# Patient Record
Sex: FEMALE | Race: WHITE | NOT HISPANIC OR LATINO | Employment: OTHER | URBAN - METROPOLITAN AREA
[De-identification: names, ages, dates, MRNs, and addresses within clinical notes are randomized per-mention and may not be internally consistent; named-entity substitution may affect disease eponyms.]

---

## 2019-12-18 ENCOUNTER — HOSPITAL ENCOUNTER (INPATIENT)
Facility: HOSPITAL | Age: 84
LOS: 2 days | Discharge: HOME HEALTH | DRG: 065 | End: 2019-12-20
Attending: EMERGENCY MEDICINE | Admitting: INTERNAL MEDICINE
Payer: MEDICARE

## 2019-12-18 DIAGNOSIS — M79.89 LEFT LEG SWELLING: ICD-10-CM

## 2019-12-18 DIAGNOSIS — I63.9 STROKE: Primary | ICD-10-CM

## 2019-12-18 DIAGNOSIS — I63.9 CVA (CEREBRAL VASCULAR ACCIDENT): ICD-10-CM

## 2019-12-18 PROBLEM — I63.412 CEREBRAL INFARCTION DUE TO EMBOLISM OF LEFT MIDDLE CEREBRAL ARTERY: Status: ACTIVE | Noted: 2019-12-18

## 2019-12-18 LAB
ALBUMIN SERPL BCP-MCNC: 3.5 G/DL (ref 3.5–5.2)
ALP SERPL-CCNC: 110 U/L (ref 55–135)
ALT SERPL W/O P-5'-P-CCNC: 17 U/L (ref 10–44)
ANION GAP SERPL CALC-SCNC: 11 MMOL/L (ref 8–16)
AST SERPL-CCNC: 24 U/L (ref 10–40)
BASOPHILS # BLD AUTO: 0.04 K/UL (ref 0–0.2)
BASOPHILS NFR BLD: 0.5 % (ref 0–1.9)
BILIRUB SERPL-MCNC: 0.4 MG/DL (ref 0.1–1)
BUN SERPL-MCNC: 31 MG/DL (ref 10–30)
CALCIUM SERPL-MCNC: 10.2 MG/DL (ref 8.7–10.5)
CHLORIDE SERPL-SCNC: 104 MMOL/L (ref 95–110)
CHOLEST SERPL-MCNC: 163 MG/DL (ref 120–199)
CHOLEST/HDLC SERPL: 2.9 {RATIO} (ref 2–5)
CO2 SERPL-SCNC: 25 MMOL/L (ref 23–29)
CREAT SERPL-MCNC: 0.8 MG/DL (ref 0.5–1.4)
DIFFERENTIAL METHOD: ABNORMAL
EOSINOPHIL # BLD AUTO: 0.1 K/UL (ref 0–0.5)
EOSINOPHIL NFR BLD: 1.5 % (ref 0–8)
ERYTHROCYTE [DISTWIDTH] IN BLOOD BY AUTOMATED COUNT: 13.6 % (ref 11.5–14.5)
EST. GFR  (AFRICAN AMERICAN): >60 ML/MIN/1.73 M^2
EST. GFR  (NON AFRICAN AMERICAN): >60 ML/MIN/1.73 M^2
GLUCOSE SERPL-MCNC: 107 MG/DL (ref 70–110)
GLUCOSE SERPL-MCNC: 138 MG/DL (ref 70–110)
HCT VFR BLD AUTO: 45.3 % (ref 37–48.5)
HDLC SERPL-MCNC: 57 MG/DL (ref 40–75)
HDLC SERPL: 35 % (ref 20–50)
HGB BLD-MCNC: 14.2 G/DL (ref 12–16)
IMM GRANULOCYTES # BLD AUTO: 0.03 K/UL (ref 0–0.04)
INR PPP: 1 (ref 0.8–1.2)
LDLC SERPL CALC-MCNC: 91.4 MG/DL (ref 63–159)
LYMPHOCYTES # BLD AUTO: 3.6 K/UL (ref 1–4.8)
LYMPHOCYTES NFR BLD: 42.3 % (ref 18–48)
MCH RBC QN AUTO: 30.7 PG (ref 27–31)
MCHC RBC AUTO-ENTMCNC: 31.3 G/DL (ref 32–36)
MCV RBC AUTO: 98 FL (ref 82–98)
MONOCYTES # BLD AUTO: 0.9 K/UL (ref 0.3–1)
MONOCYTES NFR BLD: 10.2 % (ref 4–15)
NEUTROPHILS # BLD AUTO: 3.9 K/UL (ref 1.8–7.7)
NEUTROPHILS NFR BLD: 45.1 % (ref 38–73)
NONHDLC SERPL-MCNC: 106 MG/DL
NRBC BLD-RTO: 0 /100 WBC
PLATELET # BLD AUTO: 177 K/UL (ref 150–350)
PMV BLD AUTO: 10.1 FL (ref 9.2–12.9)
POTASSIUM SERPL-SCNC: 4.5 MMOL/L (ref 3.5–5.1)
PROT SERPL-MCNC: 7.4 G/DL (ref 6–8.4)
PROTHROMBIN TIME: 10.7 SEC (ref 9–12.5)
RBC # BLD AUTO: 4.62 M/UL (ref 4–5.4)
SODIUM SERPL-SCNC: 140 MMOL/L (ref 136–145)
TRIGL SERPL-MCNC: 73 MG/DL (ref 30–150)
TSH SERPL DL<=0.005 MIU/L-ACNC: 2.76 UIU/ML (ref 0.4–4)
WBC # BLD AUTO: 8.56 K/UL (ref 3.9–12.7)

## 2019-12-18 PROCEDURE — 63600175 PHARM REV CODE 636 W HCPCS: Performed by: EMERGENCY MEDICINE

## 2019-12-18 PROCEDURE — 82962 GLUCOSE BLOOD TEST: CPT

## 2019-12-18 PROCEDURE — 80061 LIPID PANEL: CPT

## 2019-12-18 PROCEDURE — 93005 ELECTROCARDIOGRAM TRACING: CPT

## 2019-12-18 PROCEDURE — 99291 CRITICAL CARE FIRST HOUR: CPT | Mod: 25

## 2019-12-18 PROCEDURE — 84443 ASSAY THYROID STIM HORMONE: CPT

## 2019-12-18 PROCEDURE — 25000003 PHARM REV CODE 250: Performed by: EMERGENCY MEDICINE

## 2019-12-18 PROCEDURE — 80053 COMPREHEN METABOLIC PANEL: CPT

## 2019-12-18 PROCEDURE — 85610 PROTHROMBIN TIME: CPT

## 2019-12-18 PROCEDURE — 85025 COMPLETE CBC W/AUTO DIFF WBC: CPT

## 2019-12-18 PROCEDURE — 36415 COLL VENOUS BLD VENIPUNCTURE: CPT

## 2019-12-18 PROCEDURE — G0425 PR INPT TELEHEALTH CONSULT 30M: ICD-10-PCS | Mod: GT,G0,, | Performed by: PSYCHIATRY & NEUROLOGY

## 2019-12-18 PROCEDURE — G0425 INPT/ED TELECONSULT30: HCPCS | Mod: GT,G0,, | Performed by: PSYCHIATRY & NEUROLOGY

## 2019-12-18 PROCEDURE — 12000002 HC ACUTE/MED SURGE SEMI-PRIVATE ROOM

## 2019-12-18 RX ORDER — PRAVASTATIN SODIUM 20 MG/1
20 TABLET ORAL DAILY
Status: ON HOLD | COMMUNITY
End: 2020-03-02 | Stop reason: HOSPADM

## 2019-12-18 RX ORDER — METOPROLOL TARTRATE 50 MG/1
50 TABLET ORAL 2 TIMES DAILY
COMMUNITY

## 2019-12-18 RX ORDER — ASPIRIN 325 MG
325 TABLET ORAL
Status: COMPLETED | OUTPATIENT
Start: 2019-12-18 | End: 2019-12-18

## 2019-12-18 RX ORDER — ASPIRIN 81 MG/1
81 TABLET ORAL DAILY
Status: ON HOLD | COMMUNITY
End: 2019-12-20 | Stop reason: HOSPADM

## 2019-12-18 RX ORDER — LOSARTAN POTASSIUM 25 MG/1
25 TABLET ORAL DAILY
Status: ON HOLD | COMMUNITY
End: 2020-03-02 | Stop reason: HOSPADM

## 2019-12-18 RX ADMIN — SODIUM CHLORIDE 500 ML: 0.9 INJECTION, SOLUTION INTRAVENOUS at 11:12

## 2019-12-18 RX ADMIN — ASPIRIN 325 MG ORAL TABLET 325 MG: 325 PILL ORAL at 11:12

## 2019-12-19 PROBLEM — I63.9 STROKE: Status: ACTIVE | Noted: 2019-12-19

## 2019-12-19 PROBLEM — N30.00 ACUTE CYSTITIS WITHOUT HEMATURIA: Status: ACTIVE | Noted: 2019-12-19

## 2019-12-19 PROBLEM — R47.01 APHASIA: Status: ACTIVE | Noted: 2019-12-19

## 2019-12-19 PROBLEM — I10 ESSENTIAL HYPERTENSION: Status: ACTIVE | Noted: 2019-12-19

## 2019-12-19 PROBLEM — M79.89 LEFT LEG SWELLING: Status: ACTIVE | Noted: 2019-12-19

## 2019-12-19 LAB
APTT BLDCRRT: 26.4 SEC (ref 21–32)
BACTERIA #/AREA URNS HPF: ABNORMAL /HPF
BILIRUB UR QL STRIP: NEGATIVE
CK MB SERPL-MCNC: 1.7 NG/ML (ref 0.1–6.5)
CK MB SERPL-RTO: 5.2 % (ref 0–5)
CK SERPL-CCNC: 33 U/L (ref 20–180)
CLARITY UR: ABNORMAL
COLOR UR: YELLOW
ESTIMATED AVG GLUCOSE: 103 MG/DL (ref 68–131)
GLUCOSE UR QL STRIP: NEGATIVE
HBA1C MFR BLD HPLC: 5.2 % (ref 4–5.6)
HGB UR QL STRIP: ABNORMAL
INR PPP: 1 (ref 0.8–1.2)
KETONES UR QL STRIP: NEGATIVE
LEUKOCYTE ESTERASE UR QL STRIP: ABNORMAL
MICROSCOPIC COMMENT: ABNORMAL
NITRITE UR QL STRIP: POSITIVE
PH UR STRIP: 6 [PH] (ref 5–8)
PROT UR QL STRIP: ABNORMAL
PROTHROMBIN TIME: 10.4 SEC (ref 9–12.5)
RBC #/AREA URNS HPF: 5 /HPF (ref 0–4)
SP GR UR STRIP: 1.02 (ref 1–1.03)
SQUAMOUS #/AREA URNS HPF: 0 /HPF
URN SPEC COLLECT METH UR: ABNORMAL
UROBILINOGEN UR STRIP-ACNC: NEGATIVE EU/DL
WBC #/AREA URNS HPF: 60 /HPF (ref 0–5)

## 2019-12-19 PROCEDURE — 82550 ASSAY OF CK (CPK): CPT

## 2019-12-19 PROCEDURE — 87077 CULTURE AEROBIC IDENTIFY: CPT

## 2019-12-19 PROCEDURE — 12000002 HC ACUTE/MED SURGE SEMI-PRIVATE ROOM

## 2019-12-19 PROCEDURE — 97165 OT EVAL LOW COMPLEX 30 MIN: CPT

## 2019-12-19 PROCEDURE — 81000 URINALYSIS NONAUTO W/SCOPE: CPT

## 2019-12-19 PROCEDURE — 85730 THROMBOPLASTIN TIME PARTIAL: CPT

## 2019-12-19 PROCEDURE — 85610 PROTHROMBIN TIME: CPT

## 2019-12-19 PROCEDURE — 25000003 PHARM REV CODE 250: Performed by: NURSE PRACTITIONER

## 2019-12-19 PROCEDURE — 87088 URINE BACTERIA CULTURE: CPT

## 2019-12-19 PROCEDURE — 87086 URINE CULTURE/COLONY COUNT: CPT

## 2019-12-19 PROCEDURE — 92610 EVALUATE SWALLOWING FUNCTION: CPT

## 2019-12-19 PROCEDURE — 97530 THERAPEUTIC ACTIVITIES: CPT

## 2019-12-19 PROCEDURE — 97162 PT EVAL MOD COMPLEX 30 MIN: CPT

## 2019-12-19 PROCEDURE — 82553 CREATINE MB FRACTION: CPT

## 2019-12-19 PROCEDURE — 63600175 PHARM REV CODE 636 W HCPCS: Performed by: NURSE PRACTITIONER

## 2019-12-19 PROCEDURE — 96374 THER/PROPH/DIAG INJ IV PUSH: CPT

## 2019-12-19 PROCEDURE — 94761 N-INVAS EAR/PLS OXIMETRY MLT: CPT

## 2019-12-19 PROCEDURE — 36415 COLL VENOUS BLD VENIPUNCTURE: CPT

## 2019-12-19 PROCEDURE — 87186 SC STD MICRODIL/AGAR DIL: CPT

## 2019-12-19 PROCEDURE — 83036 HEMOGLOBIN GLYCOSYLATED A1C: CPT

## 2019-12-19 RX ORDER — AMOXICILLIN 250 MG
1 CAPSULE ORAL 2 TIMES DAILY PRN
Status: DISCONTINUED | OUTPATIENT
Start: 2019-12-19 | End: 2019-12-20 | Stop reason: HOSPADM

## 2019-12-19 RX ORDER — ACETAMINOPHEN 325 MG/1
650 TABLET ORAL EVERY 6 HOURS PRN
Status: DISCONTINUED | OUTPATIENT
Start: 2019-12-19 | End: 2019-12-20 | Stop reason: HOSPADM

## 2019-12-19 RX ORDER — LABETALOL HYDROCHLORIDE 5 MG/ML
10 INJECTION, SOLUTION INTRAVENOUS
Status: DISCONTINUED | OUTPATIENT
Start: 2019-12-19 | End: 2019-12-20 | Stop reason: HOSPADM

## 2019-12-19 RX ORDER — ONDANSETRON 2 MG/ML
8 INJECTION INTRAMUSCULAR; INTRAVENOUS EVERY 12 HOURS PRN
Status: DISCONTINUED | OUTPATIENT
Start: 2019-12-19 | End: 2019-12-20 | Stop reason: HOSPADM

## 2019-12-19 RX ORDER — ASPIRIN 81 MG/1
81 TABLET ORAL DAILY
Status: DISCONTINUED | OUTPATIENT
Start: 2019-12-19 | End: 2019-12-19

## 2019-12-19 RX ORDER — ASPIRIN 325 MG
325 TABLET, DELAYED RELEASE (ENTERIC COATED) ORAL DAILY
Status: DISCONTINUED | OUTPATIENT
Start: 2019-12-19 | End: 2019-12-20 | Stop reason: HOSPADM

## 2019-12-19 RX ORDER — SODIUM CHLORIDE 0.9 % (FLUSH) 0.9 %
10 SYRINGE (ML) INJECTION
Status: DISCONTINUED | OUTPATIENT
Start: 2019-12-19 | End: 2019-12-20 | Stop reason: HOSPADM

## 2019-12-19 RX ORDER — PRAVASTATIN SODIUM 10 MG/1
20 TABLET ORAL DAILY
Status: DISCONTINUED | OUTPATIENT
Start: 2019-12-19 | End: 2019-12-20 | Stop reason: HOSPADM

## 2019-12-19 RX ORDER — PANTOPRAZOLE SODIUM 40 MG/1
40 TABLET, DELAYED RELEASE ORAL DAILY
Status: DISCONTINUED | OUTPATIENT
Start: 2019-12-19 | End: 2019-12-20 | Stop reason: HOSPADM

## 2019-12-19 RX ADMIN — ACETAMINOPHEN 650 MG: 325 TABLET ORAL at 11:12

## 2019-12-19 RX ADMIN — CEFTRIAXONE 1 G: 1 INJECTION, SOLUTION INTRAVENOUS at 03:12

## 2019-12-19 NOTE — PLAN OF CARE
Problem: Physical Therapy Goal  Goal: Physical Therapy Goal  Description  Goals to be met by: 01-     Patient will increase functional independence with mobility by performin. Supine to sit with MInimal Assistance  2. Sit to stand transfer with Minimal Assistance  3. Bed to chair transfer with Minimal Assistance using Rolling Walker  4. Gait  x 50 feet with Minimal Assistance using Rolling Walker.   5. Lower extremity exercise program x20 reps    Outcome: Ongoing, Progressing   PT eval and treat completed. Pt able to ambulate 5 ft with RW min assist x2 with fatigue. OOB to chair. Son present. Pt with dysarthria. Pt to benefit from return to [facility] with HHPT

## 2019-12-19 NOTE — PT/OT/SLP EVAL
Speech Language Pathology Evaluation  Bedside Swallow    Patient Name:  Vaishali Hoyt   MRN:  [id number]  Admitting Diagnosis: Stroke    Recommendations:                 General Recommendations:  Speech language evaluation and Cognitive-linguistic evaluation  Diet recommendations:  Regular, Thin   Aspiration Precautions: Standard aspiration precautions   General Precautions: Standard, aspiration, fall, aphasia  Communication strategies:  provide increased time to answer    History:     Past Medical History:   Diagnosis Date    Hypertension     Paroxysmal SVT (supraventricular tachycardia)        History reviewed. No pertinent surgical history.    Social History: Patient resides at [facility].    Prior Intubation HX:  None this admit    Modified Barium Swallow: None    Imaging:  MRI BRAIN WITHOUT CONTRAST   Final Result   Abnormal      1. Small focus of acute posterosuperior left frontal cortical ischemia/infarction.   2. Age-appropriate cerebral volume loss and chronic microvascular ischemic changes within the periventricular and subcortical white matter of the supratentorial brain and within the almaz.   3. Minimal ethmoid sinus disease.   4. Nonspecific well-circumscribed 18 x 26 mm cystic structure present along the lateral border of the left adenoid tonsils.  Consider additional evaluation with a contrast enhanced study as deemed clinically appropriate..  This may represent a retention cyst.   This report was flagged in Epic as abnormal.         Electronically signed by: Enrico Schmitz MD   Date:    12/19/2019   Time:    11:10      US Lower Extremity Veins Left   Final Result      No evidence of deep venous thrombosis in the left lower extremity.         Electronically signed by: William Wood MD   Date:    12/19/2019   Time:    07:10      X-Ray Chest AP Portable   Final Result      Bibasilar coarsened interstitial lung markings.  No acute process identified on this limited single view.         Electronically signed  "by: Ritchie Morris MD   Date:    12/18/2019   Time:    23:27      CT Head Without Contrast   Final Result      No CT evidence of acute intracranial abnormality. If the patient has an acute, focal neurological deficit, MRI of the brain may be indicated.      Generalized cerebral volume loss and chronic ischemic changes as above.         Electronically signed by: Ritchie Morris MD   Date:    12/18/2019   Time:    22:59      US Carotid Bilateral    (Results Pending)        Prior diet: Regular/thin.    Subjective   "Every morning I eat scrambled eggs."  Asking about test results.     Objective:   Pt seen for clinical swallow evaluation. She is AAOx3, follows simple commands. States the current month as "Isa Saldana is coming."  Reduced speech intelligibility and dysfluencies noted. Son at bedside. No prior h/o dysphagia.     Oral Musculature Evaluation  · Oral Musculature: WFL(mild L weakness at rest)  · Dentition: present and adequate  · Secretion Management: adequate  · Mucosal Quality: adequate  · Mandibular Strength and Mobility: WFL  · Oral Labial Strength and Mobility: WFL  · Lingual Strength and Mobility: impaired protrusion(deviates to L upon protrusion)  · Velar Elevation: WFL  · Buccal Strength and Mobility: WFL  · Volitional Cough: fair  · Volitional Swallow: able to palpate larygneal rise  · Voice Prior to PO Intake: reduced vocal intensity, clear     Bedside Swallow Eval:   Consistencies Assessed:  · Thin liquids --via tsp, cup , straw, continuous cup/straw sips  · Puree --applesauce  · Mixed consistencies --diced peaches  · Solids --cookie     Oral Phase:   · WFL    Pharyngeal Phase:   · no overt clinical signs/symptoms of aspiration    Compensatory Strategies  · None    Treatment: Pt/family educated re: results/recs of evaluation, SLP role and POC. Receptive to information provided.     Assessment:   Clinical swallowing evaluation completed. All po trials tolerated with no overt s/s swallow dysfunction. " REC Regular textures with thin liquids. Will f/u for full SLP evaluation.      Goals:   Multidisciplinary Problems     SLP Goals     Not on file                Plan:     · Patient to be seen:      · Plan of Care expires:     · Plan of Care reviewed with:  patient, son   · SLP Follow-Up:  Yes      Time Tracking:     SLP Treatment Date:   12/19/19  Speech Start Time:  0927  Speech Stop Time:  0946     Speech Total Time (min):  19 min    Billable Minutes: Eval Swallow and Oral Function 19 and Total Time 19    Paola Skelton CCC-SLP  12/19/2019

## 2019-12-19 NOTE — PLAN OF CARE
Patient AAO, VSS. PIV/CDI/ Infusing. Telemetry monitoring in place. Pt NPO until KRISTAIN or SLP completed.  Pt having aphasia. UTI being treated with rocephin. Pt LOC being monitored by NIH stroke scale. Pt verbalized understanding of POC. Purposeful hourly/q2hr rounding done during shift to promote patient safety. Patient free from falls and injury during shift.  Bed in lowest position, brakes locked, and call light within reach.  Will continue to monitor.

## 2019-12-19 NOTE — ASSESSMENT & PLAN NOTE
Chronic, controlled.  Will hold anti- hypertensive medications for now in the setting of possible CVA. Latest blood pressure and vitals reviewed-   Temp:  [96.8 °F (36 °C)-97.9 °F (36.6 °C)]   Pulse:  [80-85]   Resp:  [16-17]   BP: (135-156)/(68-72)   SpO2:  [96 %-97 %] .   Home meds for hypertension were reviewed and noted below. Hospital anti-hypertensive changes were made as shown below.  Hypertension Medications             losartan (COZAAR) 25 MG tablet Take 25 mg by mouth once daily.    metoprolol tartrate (LOPRESSOR) 50 MG tablet Take 50 mg by mouth 2 (two) times daily.      Hospital Medications             labetalol injection 10 mg 10 mg, Intravenous, Every 15 min PRN, Max dose 300 mg/24hrs<BR>Notify MD if 2 doses given within 45 minutes<BR>Do not give if heart rate less than 65 and call MD        Will utilize p.r.n. blood pressure medication only if patient's blood pressure greater than  220/110 and she develops symptoms such as worsening chest pain or shortness of breath.

## 2019-12-19 NOTE — CARE UPDATE
"As of 0001 spoke to Dr. Walker in reference to my several attempts (5) to obtain a PT/INR value in the I-STAT machine, were not successful with those attempts. No value was obtained with the I-STAT reading stated; "No Clot Detected See Manual."  "

## 2019-12-19 NOTE — NURSING
Pt becoming increasingly confused and combative, made NP Zina aware and will contact family to come to bedside. Telemetry monitor not on s/t Patient LOC.

## 2019-12-19 NOTE — ED PROVIDER NOTES
Encounter Date: 12/18/2019    SCRIBE #1 NOTE: I, aSmy Garg, am scribing for, and in the presence of, Baldemar Walker MD.       History     Chief Complaint   Patient presents with    Cerebrovascular Accident     slurred speech since 530 pm       Time seen by provider: 10:48 PM on 12/18/2019    Vaishali Hoyt is a 89 y.o. female with PMHx of UTI  who presents to the ED via EMS with an onset of slurred speech. The son told EMS the patient was last seen normal x6 hours PTA. When she woke up she had slurred speech. The patient has no Hx of strokes. The patient denies numbness, vision change, or any other symptoms at this time. No pertinent PSHx. No PSHx. Pt is a poor historian for recent and remote events.      The history is provided by the patient, a relative and the EMS personnel.     Review of patient's allergies indicates:  Allergies not on file  No past medical history on file.  No past surgical history on file.  No family history on file.  Social History     Tobacco Use    Smoking status: Not on file   Substance Use Topics    Alcohol use: Not on file    Drug use: Not on file     Review of Systems   Constitutional: Negative for fever.   HENT: Negative for congestion.    Eyes: Negative for visual disturbance.   Respiratory: Negative for wheezing.    Cardiovascular: Negative for chest pain.   Gastrointestinal: Negative for abdominal pain.   Genitourinary: Negative for dysuria.   Musculoskeletal: Negative for joint swelling.   Skin: Negative for rash.   Neurological: Positive for speech difficulty. Negative for syncope and numbness.   Hematological: Does not bruise/bleed easily.   Psychiatric/Behavioral: Negative for confusion.       Physical Exam     Initial Vitals   BP Pulse Resp Temp SpO2   -- -- -- -- --      MAP       --         Physical Exam    Nursing note and vitals reviewed.  Constitutional: She appears well-nourished.   HENT:   Head: Normocephalic and atraumatic.   Eyes: Conjunctivae and EOM are normal.    Neck: Normal range of motion. Neck supple. No thyroid mass present.   Cardiovascular: Normal rate, regular rhythm and normal heart sounds. Exam reveals no gallop and no friction rub.    No murmur heard.  Pulmonary/Chest: Breath sounds normal. She has no wheezes. She has no rhonchi. She has no rales.   Abdominal: Soft. Normal appearance and bowel sounds are normal. There is no tenderness.   Neurological: She is alert and oriented to person, place, and time. She has normal strength. No cranial nerve deficit or sensory deficit.   Mild slurring of speech, without lateralizing symptoms, GCS 15   Skin: Skin is warm and dry. No rash noted. No erythema.   Psychiatric: She has a normal mood and affect. Her speech is normal. Cognition and memory are normal.         ED Course   Procedures  Labs Reviewed   CBC W/ AUTO DIFFERENTIAL   COMPREHENSIVE METABOLIC PANEL   PROTIME-INR   TSH   LIPID PANEL   POCT GLUCOSE, HAND-HELD DEVICE   POCT PROTIME-INR     EKG Readings: (Independently Interpreted)   Initial Reading: No STEMI. Heart Rate: 79.   Normal Sinus Rhythm of 79 bpm. Left axis deviation. LBBB. No inappropriate concordance. No STEMI.       Imaging Results          CT Head Without Contrast (In process)                  Medical Decision Making:   ED Management:  Patient was interviewed and examined emergently in the presence of her son.  At this time she has no lateralizing deficits but some slurred speech is appreciated.  Stroke alert called and the patient was evaluated by the neurologist.  At this time she is out of the window for tPA, as well as over the age and he do not think that she is a good surgical candidate considering her age and comorbidities.  I am in agreement.  Additional labs are found to be unremarkable.  CT scan is negative for bleed or evidence of mass effect.  The patient does have evidence of UTI will be treated for this.  Further neurologic recommendations that she receive aspirin further TIA/CVA risk  stratification.  Case discussed with and accepted by the on-call nurse practitioner.  Patient improving with mild slurring speech by time leaving the ER.            Scribe Attestation:   Scribe #1: I performed the above scribed service and the documentation accurately describes the services I performed. I attest to the accuracy of the note.    Attending Attestation:         Attending Critical Care:   Critical Care Times:   Direct Patient Care (initial evaluation, reassessments, and time considering the case)................................................................15 minutes.   Additional History from reviewing old medical records or taking additional history from the family, EMS, PCP, etc.......................5 minutes.   Ordering, Reviewing, and Interpreting Diagnostic Studies...............................................................................................................5 minutes.   Documentation..................................................................................................................................................................................5 minutes.   Consultation with other Physicians. .................................................................................................................................................5 minutes.   Consultation with the patient's family directly relating to the patient's condition, care, and DNR status (when patient unable)......5 minutes.   Other..................................................................................................................................................................................................0 minutes.   ==============================================================  · Total Critical Care Time - exclusive of procedural time: 40 minutes.  ==============================================================  Critical care was necessary to treat or prevent imminent or  life-threatening deterioration of the following conditions: stroke.   The following critical care procedures were done by me (see procedure notes): pulse oximetry.   Critical care was time spent personally by me on the following activities: obtaining history from patient or relative, examination of patient, review of old charts, ordering lab, x-rays, and/or EKG, development of treatment plan with patient or relative, ordering and performing treatments and interventions, evaluation of patient's response to treatment, discussion with consultants, discussions with primary provider, re-evaluation of patient's conition and interpretation of cardiac measurements.   Critical Care Condition: potentially life-threatening       Attending ED Notes:   10:58 Head-CT indicates no head bleeds.   I, Dr. Baldemar Walker, personally performed the services described in this documentation. All medical record entries made by the scribe were at my direction and in my presence.  I have reviewed the chart and agree that the record reflects my personal performance and is accurate and complete. Baldemar Walker MD.  5:52 AM 12/19/2019                        Clinical Impression:       ICD-10-CM ICD-9-CM   1. Stroke I63.9 434.91   2. Left leg swelling M79.89 729.81         Disposition:   Disposition: Placed in Observation  Condition: Fair                     Baldemar Walker MD  12/19/19 0552

## 2019-12-19 NOTE — ASSESSMENT & PLAN NOTE
Currently on pathway.  Admit to telemetry floor.  Neurochecks q 4 hrs  Fall and aspiration precautions.  NPO until evaluated by speech therapist for swallowing assessment.  Start Aspirin 325 mg po q day per tele stroke recommendation.  Continue home statin.  Neurology consult.  Will defer to neurology for further antithrombotic therapy.   MRI Brain.  Carotid U/S  2 D ECHO for evaluation of intra-cardiac thrombus or valvular heart disease.  Check Lipid Profile.  Consult Physical Therapy and Occupational therapy for evaluation and treatment.

## 2019-12-19 NOTE — SUBJECTIVE & OBJECTIVE
"  Woke up with symptoms?: no    Recent bleeding noted: no  Does the patient take any Blood Thinners? no  Medications: No relevant medications      Past Medical History: hypertension, hyperlipidemia and Heart Problems    Past Surgical History: no major surgeries within the last 2 weeks    Family History: no relevant history    Social History: no smoking, no drinking, no drugs    Allergies: Contrast Media  Iodine And Iodide Containing Products contrast allergy    Review of Systems   Neurological: Positive for speech difficulty.   All other systems reviewed and are negative.    Objective:   Vitals: Blood pressure 135/72, pulse 80, temperature 97.9 °F (36.6 °C), temperature source Oral, resp. rate 17, height 5' 4" (1.626 m), weight 54.4 kg (120 lb), SpO2 97 %.      CT READ: Yes  No hemmorhage. No mass effect. No early infarct signs.     Physical Exam   Constitutional: She appears cachectic.   HENT:   Head: Normocephalic.   Eyes: EOM are normal.   Neck: Normal range of motion.   Cardiovascular: Normal rate and regular rhythm.   Pulmonary/Chest: Effort normal.   Abdominal: Soft.   Neurological: She is alert. She displays normal reflexes. No cranial nerve deficit or sensory deficit. Coordination normal. GCS eye subscore is 4. GCS verbal subscore is 5. GCS motor subscore is 6.   Mild aphasia         "

## 2019-12-19 NOTE — HPI
Vaishali Hoyt is a 89 y.o. female with a PMHx of HTN and SVT who presented to the ED for slurred speech that began at unknown time. Her last known normal was at 5pm per EMS report. The patient is able to give only limited history due to aphasia. Her son reports she lives in an assisted living facility. He states she was at baseline around 5pm then she took a nap, and she called for help with getting to the restroom and the tech at the assisted living facility noticed her speech was slurred. EMS was called. The patient denies any aggravating or alleviating factors. She denies any associated symptoms. She denies any fever, chills, SOB, chest pain, cough, N/V/D, numbness/tingling, focal neuro deficits, or any urinary symptoms.  Her work up in the ED was significant for a nitrite positive UTI, CT head was negative, and chest xray with no acute process. A stroke code was activated in the ED. The patient will be placed in observation under hospital medicine for further evaluation and work up. Neurology will be consulted.

## 2019-12-19 NOTE — PT/OT/SLP EVAL
Physical Therapy Evaluation    Patient Name:  Vaishali Hoyt   MRN:  [id number]    Recommendations:     Discharge Recommendations:  home health PT   Discharge Equipment Recommendations: none   Barriers to discharge: None    Assessment:     Vaishali Hoyt is a 89 y.o. female admitted with a medical diagnosis of Stroke.  She presents with the following impairments/functional limitations:  weakness, impaired functional mobilty, decreased safety awareness, impaired cognition . Pt resides at [facility] x 1 year with caregivers. Son stated pt requiring assist for transfers to commode and that she does not walk much. .pt is alert with slurring of speech, min assist for sitting EOB. Pt tolerated brief standing and gait with RW 5 ft, OOB to chair. Pt to benefit from HHPT    Rehab Prognosis: Fair; patient would benefit from acute skilled PT services to address these deficits and reach maximum level of function.    Recent Surgery: * No surgery found *      Plan:     During this hospitalization, patient to be seen 6 x/week to address the identified rehab impairments via gait training, therapeutic activities, therapeutic exercises and progress toward the following goals:    · Plan of Care Expires:  01/10/20    Subjective   Son stated pt has clear speech sometimes  Chief Complaint: pt with slurring of speech  Patient/Family Comments/goals: none stated  Pain/Comfort:  · Pain Rating 1: 0/10    Patients cultural, spiritual, Denominational conflicts given the current situation:      Living Environment:  Home at [facility]- has caregiver  Prior to admission, patients level of function was assist for all mobility.  Equipment used at home: wheelchair, walker, rolling.  DME owned (not currently used): none.  Upon discharge, patient will have assistance from caregiver.    Objective:     Communicated with nurse Ramirez prior to session.  Patient found HOB elevated with    upon PT entry to room.    General Precautions: Standard, fall,  aphasia   Orthopedic Precautions:N/A   Braces: N/A     Exams:  · Postural Exam:  Patient presented with the following abnormalities:    · -       Rounded shoulders  · -       Forward head  · RLE ROM: WFL  · RLE Strength: WFL  · LLE ROM: WFL  · LLE Strength: WFL    Functional Mobility:  · Bed Mobility:     · Scooting: minimum assistance  · Supine to Sit: minimum assistance  · Transfers:     · Sit to Stand:  minimum assistance with rolling walker  · Bed to Chair: minimum assistance and of 2 persons with  rolling walker  using  Stand Pivot  · Gait: 5 ft with RW with early fatigue min assist2      Therapeutic Activities and Exercises:   OOB to chair with chair alarm on    AM-PAC 6 CLICK MOBILITY  Total Score:15     Patient left up in chair with all lines intact, call button in reach, chair alarm on and son present.    GOALS:   Multidisciplinary Problems     Physical Therapy Goals        Problem: Physical Therapy Goal    Goal Priority Disciplines Outcome Goal Variances Interventions   Physical Therapy Goal     PT, PT/OT Ongoing, Progressing     Description:  Goals to be met by: 01-     Patient will increase functional independence with mobility by performin. Supine to sit with MInimal Assistance  2. Sit to stand transfer with Minimal Assistance  3. Bed to chair transfer with Minimal Assistance using Rolling Walker  4. Gait  x 50 feet with Minimal Assistance using Rolling Walker.   5. Lower extremity exercise program x20 reps                     History:     Past Medical History:   Diagnosis Date    Hypertension     Paroxysmal SVT (supraventricular tachycardia)        History reviewed. No pertinent surgical history.    Time Tracking:     PT Received On: 19  PT Start Time: 1113     PT Stop Time: 1136  PT Total Time (min): 23 min     Billable Minutes: Evaluation 10 and Therapeutic Activity 13      Sissy Burrows, PT  2019

## 2019-12-19 NOTE — PLAN OF CARE
12/19/19 0812   Patient Assessment/Suction   Level of Consciousness (AVPU) alert   Respiratory Effort Normal;Unlabored   PRE-TX-O2   O2 Device (Oxygen Therapy) room air   SpO2 95 %   Pulse Oximetry Type Intermittent   $ Pulse Oximetry - Multiple Charge Pulse Oximetry - Multiple

## 2019-12-19 NOTE — CONSULTS
[facility]  Vascular Neurology  Comprehensive Stroke Center  Tele-Consultation Note      Inpatient consult to Telemedicine-Stroke  Consult performed by: Ariel Ruiz MD  Consult ordered by: Baldemar Walker MD          Consulting Provider: BALDEMAR WALKER.  Current Providers  No providers found    Patient Location:  [facility] EMERGENCY DEPARTMENT Emergency Department  Spoke hospital nurse at bedside with patient assisting consultant.     Patient information was obtained from patient and relative(s).         Assessment/Plan:     STROKE DOCUMENTATION     Acute Stroke Times:   Acute Stroke Times   Last Known Normal Date: 12/18/19  Last Known Normal Time: 1730  Stroke Team Arrival Date: 12/18/19  Stroke Team Arrival Time: 2252  CT Interpretation Time: 2256    NIH Scale:  1a. Level of Consciousness: 0-->Alert, keenly responsive  1b. LOC Questions: 2-->Answers neither question correctly  1c. LOC Commands: 0-->Performs both tasks correctly  2. Best Gaze: 0-->Normal  3. Visual: 0-->No visual loss  4. Facial Palsy: 0-->Normal symmetrical movements  5a. Motor Arm, Left: 0-->No drift, limb holds 90 (or 45) degrees for full 10 secs  5b. Motor Arm, Right: 0-->No drift, limb holds 90 (or 45) degrees for full 10 secs  6a. Motor Leg, Left: 0-->No drift, leg holds 30 degree position for full 5 secs  6b. Motor Leg, Right: 0-->No drift, leg holds 30 degree position for full 5 secs  7. Limb Ataxia: 0-->Absent  8. Sensory: 0-->Normal, no sensory loss  9. Best Language: 1-->Mild-to-moderate aphasia, some obvious loss of fluency or facility of comprehension, without significant limitation on ideas expressed or form of expression. Reduction of speech and/or comprehension, however, makes conversation. . . (see row details)  10. Dysarthria: 0-->Normal  11. Extinction and Inattention (formerly Neglect): 0-->No abnormality  Total (NIH Stroke Scale): 3     Modified Patricia Score: 4  [place] Coma Scale:   "  ABCD2 Score:    [id number] Score:   HAS -BLED Score:   ICH Score:   Hunt & Lopez Classification:       Diagnoses:   Cerebral infarction due to embolism of left middle cerebral artery  SEE HPI    Acute mild aphasia. Improving. Afib highly suspected  CT head no acute changes  Not TPA or Intra-arterial Therapy/ Catheter Based Intervention candidate    Antithrombotics for secondary stroke prevention: Antiplatelets: Aspirin: 325 mg daily    Statins for secondary stroke prevention and hyperlipidemia, if present:   Statins: Atorvastatin- 40 mg daily    Aggressive risk factor modification: HTN, HLD, Diet, Exercise     Rehab efforts: The patient has been evaluated by a stroke team provider and the therapy needs have been fully considered based off the presenting complaints and exam findings. The following therapy evaluations are needed: PT evaluate and treat, OT evaluate and treat, SLP evaluate and treat, PM&R evaluate for appropriate placement    Diagnostics ordered/pending: Carotid ultrasound to assess vasculature, HgbA1C to assess blood glucose levels, Lipid Profile to assess cholesterol levels, MRA head to assess vasculature, MRA neck/arch to assess vasculature, MRI head without contrast to assess brain parenchyma, TTE to assess cardiac function/status , Other: loop recorder    VTE prophylaxis: Heparin 5000 units SQ every 8 hours    BP parameters: Infarct: No intervention, SBP <220            Blood pressure 135/72, pulse 80, temperature 97.9 °F (36.6 °C), temperature source Oral, resp. rate 17, height 5' 4" (1.626 m), weight 54.4 kg (120 lb), SpO2 97 %.  Alteplase Eligible?: No  Alteplase Recommendation: Alteplase not recommended due to Outside of treatment window   Possible Interventional Revascularization Candidate? poor baseline function    Disposition Recommendation: admit to inpatient  do not transfer    Subjective:     History of Present Illness:  89 year old with HT< HLD, and atrial tachycardia (possibly afib) " "on no blood thinners presents with aphasia  LSN 1730. CT head no acute changes  Not TPA candidate due to timing  Not Intra-arterial Therapy/ Catheter Based Intervention candidate due to poor baseline functioning - non ambulatory, required assistance with most ADL's MRS=4      Woke up with symptoms?: no    Recent bleeding noted: no  Does the patient take any Blood Thinners? no  Medications: No relevant medications      Past Medical History: hypertension, hyperlipidemia and Heart Problems    Past Surgical History: no major surgeries within the last 2 weeks    Family History: no relevant history    Social History: no smoking, no drinking, no drugs    Allergies: Contrast Media  Iodine And Iodide Containing Products contrast allergy    Review of Systems   Neurological: Positive for speech difficulty.   All other systems reviewed and are negative.    Objective:   Vitals: Blood pressure 135/72, pulse 80, temperature 97.9 °F (36.6 °C), temperature source Oral, resp. rate 17, height 5' 4" (1.626 m), weight 54.4 kg (120 lb), SpO2 97 %.      CT READ: Yes  No hemmorhage. No mass effect. No early infarct signs.     Physical Exam   Constitutional: She appears cachectic.   HENT:   Head: Normocephalic.   Eyes: EOM are normal.   Neck: Normal range of motion.   Cardiovascular: Normal rate and regular rhythm.   Pulmonary/Chest: Effort normal.   Abdominal: Soft.   Neurological: She is alert. She displays normal reflexes. No cranial nerve deficit or sensory deficit. Coordination normal. GCS eye subscore is 4. GCS verbal subscore is 5. GCS motor subscore is 6.   Mild aphasia             Recommended the emergency room physician to have a brief discussion with the patient and/or family if available regarding the risks and benefits of treatment, and to briefly document the occurrence of that discussion in his clinical encounter note.     The attending portion of this evaluation, treatment, and documentation was performed per Ariel PEPPER" MD Sara via audiovisual.    Billing code:  (moderate to severe stroke, large areas of edema, some mimics)    · This patient has a critical neurological condition/illness, with high morbidity and mortality.  · There is a high probability for acute neurological change leading to clinical and possibly life-threatening deterioration requiring highest level of physician preparedness for urgent intervention.  · Care was coordinated with other physicians involved in the patient's care.  · Radiologic studies and laboratory data were reviewed and interpreted, and plan of care was re-assessed based on the results.  · Diagnosis, treatment options and prognosis may have been discussed with the patient and/or family members or caregiver.  · Further advanced medical management and further evaluation is warranted for his care.      In your opinion, this was a: Tier 1 Van Positive    Consult End Time: 11:17 PM     Ariel Ruiz MD  Comprehensive Stroke Center  Vascular Neurology   [facility]

## 2019-12-19 NOTE — HPI
89 year old with HT< HLD, and atrial tachycardia (possibly afib) on no blood thinners presents with aphasia  LSN 1730. CT head no acute changes  Not TPA candidate due to timing  Not Intra-arterial Therapy/ Catheter Based Intervention candidate due to poor baseline functioning - non ambulatory, required assistance with most ADL's MRS=4

## 2019-12-19 NOTE — PLAN OF CARE
"Cm completed the assessment at pt's bedside.  Pt up in chair, cm completed the assessment with Evy.  Pt came from [facility] and will return on discharge.  Son had POA/LW, placed in chart.  Family denies diabetes, dialysis and coumadin.  PCP is Dr. Escalante. Insurance verified as PHN.  Disposition:  Pt will return to [facility], per Evy.  Teo verbalized the following," Pt was not active much at [facility].  She uses Transport chair and walker sometimes with assistance to bsc(small steps)."  Son would like HH with Speech and PT at [facility].  Cm will update Dr. Askew.       12/19/19 1110   Discharge Assessment   Assessment Type Discharge Planning Assessment   Confirmed/corrected address and phone number on facesheet? Yes   Assessment information obtained from? Patient  (Evy)   Communicated expected length of stay with patient/caregiver yes   Prior to hospitilization cognitive status: Alert/Oriented   Prior to hospitalization functional status: Assistive Equipment;Needs Assistance   Current cognitive status: Alert/Oriented   Facility Arrived From: [facility]   Lives With facility resident   Able to Return to Prior Arrangements yes   Is patient able to care for self after discharge? No   Who are your caregiver(s) and their phone number(s)? [phone]   Patient's perception of discharge disposition assisted living   Readmission Within the Last 30 Days no previous admission in last 30 days   Patient currently being followed by outpatient case management? Yes   If yes, name of outpatient case management following: insurance company assigned oupatient case management   Patient currently receives any other outside agency services? No   Equipment Currently Used at Home walker, rolling  (tansport chair)   Do you have any problems affording any of your prescribed medications? No   Is the patient taking medications as prescribed? yes   Does the patient " have transportation home? Yes   Transportation Anticipated family or friend will provide   Dialysis Name and Scheduled days na   Does the patient receive services at the Coumadin Clinic? No   Discharge Plan A Assisted Living   DME Needed Upon Discharge  none   Patient/Family in Agreement with Plan yes

## 2019-12-19 NOTE — H&P
[facility] Hospital Medicine  History & Physical    Patient Name: Vaishali Hoyt  MRN: [id number]  Admission Date: 12/18/2019  Attending Physician: Rodolfo Askew MD   Primary Care Provider: William Escalante MD         Patient information was obtained from patient, relative(s) and ER records.     Subjective:     Principal Problem:Stroke    Chief Complaint:   Chief Complaint   Patient presents with    Cerebrovascular Accident     slurred speech since 530 pm        HPI: Vaishali Hoyt is a 89 y.o. female with a PMHx of HTN and SVT who presented to the ED for slurred speech that began at unknown time. Her last known normal was at 5pm per EMS report. The patient is able to give only limited history due to aphasia. Her son reports she lives in an assisted living facility. He states she was at baseline around 5pm then she took a nap, and she called for help with getting to the restroom and the tech at the assisted living facility noticed her speech was slurred. EMS was called. The patient denies any aggravating or alleviating factors. She denies any associated symptoms. She denies any fever, chills, SOB, chest pain, cough, N/V/D, numbness/tingling, focal neuro deficits, or any urinary symptoms.  Her work up in the ED was significant for a nitrite positive UTI, CT head was negative, and chest xray with no acute process. A stroke code was activated in the ED. The patient will be placed in observation under hospital medicine for further evaluation and work up. Neurology will be consulted.    Past Medical History:   Diagnosis Date    Hypertension     Paroxysmal SVT (supraventricular tachycardia)        History reviewed. No pertinent surgical history.    Review of patient's allergies indicates:   Allergen Reactions    Contrast media     Iodine and iodide containing products        No current facility-administered medications on file prior to encounter.      Current Outpatient Medications on File  Prior to Encounter   Medication Sig    aspirin (ECOTRIN) 81 MG EC tablet Take 81 mg by mouth once daily.    losartan (COZAAR) 25 MG tablet Take 25 mg by mouth once daily.    metoprolol tartrate (LOPRESSOR) 50 MG tablet Take 50 mg by mouth 2 (two) times daily.    pravastatin (PRAVACHOL) 20 MG tablet Take 20 mg by mouth once daily.     Family History     Problem Relation (Age of Onset)    Alcohol abuse Father    No Known Problems Mother        Tobacco Use    Smoking status: Former Smoker     Packs/day: 0.50     Years: 25.00     Pack years: 12.50     Last attempt to quit: 1966     Years since quittin.9    Smokeless tobacco: Never Used   Substance and Sexual Activity    Alcohol use: Never     Frequency: Never    Drug use: Never    Sexual activity: Not Currently     Review of Systems   Constitutional: Negative for activity change, appetite change, chills, fatigue and fever.   HENT: Negative for congestion, ear pain, mouth sores, postnasal drip, rhinorrhea, sinus pressure and sore throat.    Eyes: Negative for photophobia, discharge, itching and visual disturbance.   Respiratory: Negative for cough, chest tightness, shortness of breath and wheezing.    Cardiovascular: Negative for chest pain, palpitations and leg swelling.   Gastrointestinal: Negative for abdominal distention, abdominal pain, blood in stool, constipation, diarrhea, nausea and vomiting.   Endocrine: Negative for cold intolerance, polydipsia and polyuria.   Genitourinary: Negative for difficulty urinating, dysuria, flank pain, frequency and urgency.   Musculoskeletal: Negative for arthralgias, back pain, joint swelling, neck pain and neck stiffness.   Skin: Negative for color change, pallor, rash and wound.   Allergic/Immunologic: Negative for environmental allergies and food allergies.   Neurological: Positive for speech difficulty. Negative for dizziness, syncope, facial asymmetry, weakness, light-headedness, numbness and headaches.    Hematological: Negative for adenopathy.   Psychiatric/Behavioral: Negative for agitation, confusion, sleep disturbance and suicidal ideas. The patient is not nervous/anxious.    All other systems reviewed and are negative.    Objective:     Vital Signs (Most Recent):  Temp: 97.6 °F (36.4 °C) (12/19/19 0145)  Pulse: 85 (12/19/19 0144)  Resp: 16 (12/19/19 0144)  BP: (!) 156/68 (12/19/19 0144)  SpO2: 96 % (12/19/19 0144) Vital Signs (24h Range):  Temp:  [96.8 °F (36 °C)-97.9 °F (36.6 °C)] 97.6 °F (36.4 °C)  Pulse:  [80-85] 85  Resp:  [16-17] 16  SpO2:  [96 %-97 %] 96 %  BP: (135-156)/(68-72) 156/68     Weight: 54.4 kg (120 lb)  Body mass index is 20.6 kg/m².    Physical Exam   Constitutional: She is oriented to person, place, and time. She appears well-developed and well-nourished.   HENT:   Head: Normocephalic and atraumatic.   Right Ear: External ear normal.   Left Ear: External ear normal.   Mouth/Throat: Oropharynx is clear and moist.   Eyes: Pupils are equal, round, and reactive to light. Conjunctivae and EOM are normal.   Neck: Normal range of motion. Neck supple. No JVD present.   Cardiovascular: Normal rate, regular rhythm, normal heart sounds and intact distal pulses.   No murmur heard.  Pulmonary/Chest: Effort normal and breath sounds normal. No stridor. No respiratory distress. She has no wheezes.   Lungs CTA bilaterally, currently on room air   Abdominal: Soft. Bowel sounds are normal.   Soft and nontender    Musculoskeletal: Normal range of motion. She exhibits no edema.   Neurological: She is alert and oriented to person, place, and time. No sensory deficit.   Patient is following commands and responding appropriately but is noted to have moderate to severe aphasia. No focal neuro deficits noted. Strength equal in all extremities. NIHSS 2   Skin: Skin is warm and dry. Capillary refill takes less than 2 seconds.   Bruising noted to bilateral shins   Psychiatric: She has a normal mood and affect. Her  behavior is normal.   Nursing note and vitals reviewed.        CRANIAL NERVES     CN III, IV, VI   Pupils are equal, round, and reactive to light.  Extraocular motions are normal.        Significant Labs:   BMP:   Recent Labs   Lab 12/18/19  2306         K 4.5      CO2 25   BUN 31*   CREATININE 0.8   CALCIUM 10.2     CBC:   Recent Labs   Lab 12/18/19 2306   WBC 8.56   HGB 14.2   HCT 45.3        TSH:   Recent Labs   Lab 12/18/19  2306   TSH 2.756     Urine Studies:   Recent Labs   Lab 12/19/19  0007   COLORU Yellow   APPEARANCEUA Hazy*   PHUR 6.0   SPECGRAV 1.020   PROTEINUA Trace*   GLUCUA Negative   KETONESU Negative   BILIRUBINUA Negative   OCCULTUA 1+*   NITRITE Positive*   UROBILINOGEN Negative   LEUKOCYTESUR 1+*   RBCUA 5*   WBCUA 60*   BACTERIA Many*   SQUAMEPITHEL 0     All pertinent labs within the past 24 hours have been reviewed.    Significant Imaging: I have reviewed and interpreted all pertinent imaging results/findings within the past 24 hours.    Assessment/Plan:     * Stroke  Currently on pathway.  Admit to telemetry floor.  Neurochecks q 4 hrs  Fall and aspiration precautions.  NPO until evaluated by speech therapist for swallowing assessment.  Start Aspirin 325 mg po q day per tele stroke recommendation.  Continue home statin.  Neurology consult.  Will defer to neurology for further antithrombotic therapy.   MRI Brain.  Carotid U/S  2 D ECHO for evaluation of intra-cardiac thrombus or valvular heart disease.  Check Lipid Profile.  Consult Physical Therapy and Occupational therapy for evaluation and treatment.      Acute cystitis without hematuria  Urinalysis reviewed, urine culture pending.  Started on IV rocephin.    Aphasia  Aspiration precautions.  NPO pending KRISTIAN screening.  SLP consult.         Essential hypertension  Chronic, controlled.  Will hold anti- hypertensive medications for now in the setting of possible CVA. Latest blood pressure and vitals reviewed-    Temp:  [96.8 °F (36 °C)-97.9 °F (36.6 °C)]   Pulse:  [80-85]   Resp:  [16-17]   BP: (135-156)/(68-72)   SpO2:  [96 %-97 %] .   Home meds for hypertension were reviewed and noted below. Hospital anti-hypertensive changes were made as shown below.  Hypertension Medications             losartan (COZAAR) 25 MG tablet Take 25 mg by mouth once daily.    metoprolol tartrate (LOPRESSOR) 50 MG tablet Take 50 mg by mouth 2 (two) times daily.      Hospital Medications             labetalol injection 10 mg 10 mg, Intravenous, Every 15 min PRN, Max dose 300 mg/24hrs<BR>Notify MD if 2 doses given within 45 minutes<BR>Do not give if heart rate less than 65 and call MD        Will utilize p.r.n. blood pressure medication only if patient's blood pressure greater than  220/110 and she develops symptoms such as worsening chest pain or shortness of breath.        Left leg swelling  Recurrent problem according to son.   U/S ordered of LLE to r/o DVT.       VTE Risk Mitigation (From admission, onward)         Ordered     IP VTE HIGH RISK PATIENT  Once      12/19/19 0115     Place sequential compression device  Until discontinued      12/19/19 0115     Place WESLEY hose  Until discontinued      12/19/19 0115     Reason for No Pharmacological VTE Prophylaxis  Once     Question:  Reasons:  Answer:  Physician Provided (leave comment)    12/19/19 0115            Time spent seeing patient( greater than 1/2 spent in direct contact) : 40 minutes    I spent 30 minutes of face to face discussion regarding advance directives and end of life planning which included patient and family. Patient/Family understands the seriousness of their condition and would like to make their end of life decisions known as follows- DNR      ALFREDO Alcantar  Department of Hospital Medicine   [facility]      I have reviewed and concur with the Nurse practitioner's history, physical, assessment, and plan.  I have personally interviewed and  examined the patient at bedside.  See below addendum for my evaluation and additional findings.    Discussed with the family about the MRI finding of new stroke and plan for treating her stroke and her speech deficit moving forward. No changes made to the plan. Will follow up with neurology recommendations

## 2019-12-19 NOTE — PROGRESS NOTES
[facility]  Adult Nutrition  Progress Note    SUMMARY   Intervention-nutrition education, general healthful diet     Recommendations    Recommendation/Intervention:   1) Change regular to cardiac diet   2) Pt weight taken weekly or as needed   3) Cont nutrition education as needed     Goals:   1) increase PO intake to 75% or greater (by next follow-up)   2) increase overall fluid intake (by next follow-up)   Nutrition Goal Status: new  Communication of RD Recs: (POC, sticky note)    Reason for Assessment    Reason For Assessment: consult   Diagnosis: stroke/CVA  Relevant Medical History: HTN, SVT  Interdisciplinary Rounds: did not attend    General Information Comments: 89 y/o female admitted to the hospital with slurred speech s/p a stroke. Pt was unable to verbally communicate, so son was consulted instead. Pt still has a good appetite with a PO intake of 50-75%. Before stroke PO intake was % overall. She snacks occasionally (consuming chocolates as her snack). No N/V/C/D. Has some problems while chewing, but is able to eat just fine with assistance. No NFPE conducted but +3 pitting edema reported in paitent's chart (12/19/19). Noticed swelling in the lower legs and ankles and mild-moderate wasting in the clavical area. Pt was given nutrition education handout on stroke prevention heart health because of recent episode.    Nutrition Discharge Planning: low sodium diet     Nutrition Risk Screen    Nutrition Risk Screen: no indicators present    Nutrition/Diet History    Typical Food/Fluid Intake: 2-3 cups of fluid daily; takes sips of water ocassionally, mainly drinking in the morning (drinks coffee/orange juice). 50-75% PO intake.   Food Preferences: (no preferences)  Spiritual, Cultural Beliefs, Bahai Practices, Values that Affect Care: no  Supplemental Drinks or Food Habits: ((None at this time))  Food Allergies: NKFA  Factors Affecting Nutritional Intake: impaired cognitive  "status/motor control, chewing difficulties/inability to chew food    Anthropometrics    Temp: 97.4 °F (36.3 °C)  Height Method: Measured((12/19/19))  Height: 5' 4"  Height (inches): 64 in  Weight Method: Bed Scale  Weight: 54.4 kg (119 lb 14.9 oz)  Weight (lb): 119.93 lb  Ideal Body Weight (IBW), Female: 120 lb  % Ideal Body Weight, Female (lb): 99.94 %  BMI (Calculated): 20.6  BMI Grade: (20.5 (under for greater than 65))  Weight Loss: unintentional     Lab/Procedures/Meds    Pertinent Labs Reviewed: reviewed  Pertinent Labs Comments: all genral labs not taken   Pertinent Medications Reviewed: reviewed  Pertinent Medications Comments: ceftriaxone 1 g in dextrose 5% 50 mL, pantoprazole, pravastatin     Estimated/Assessed Needs    Weight Used For Calorie Calculations: 54.4 kg (119 lb 14.9 oz)  Energy Calorie Requirements (kcal): 1364  Energy Need Method: [place]-St Jeor(x1.5 for weight gain )  Protein Requirements: 54-60g/kg(x1-1.1g/kg (normal protien needs))  Weight Used For Protein Calculations: 54.4 kg (119 lb 14.9 oz)  Fluid Requirements (mL): 1364 mL  Estimated Fluid Requirement Method: RDA Method  RDA Method (mL): 1364      Nutrition Prescription Ordered    Current Diet Order: Regular    Evaluation of Received Nutrient/Fluid Intake    Energy Calories Required: meeting needs (over 75% of estimated needs)   Protein Required: not meeting needs  Fluid Required: not meeting needs  Total Fluid Intake (mL/kg): 2-3 cups daily (16-24 oz)  Comments: (takes sips everyone once in a while but needs assistance)  Tolerance: tolerating  % Intake of Estimated Energy Needs: %  % Meal Intake: 50-75%     Nutrition Risk    Level of Risk/Frequency of Follow-up: (1x weekly ) (low)     Assessment and Plan    Nutrition Problem  Inadequate energy intake     Related to (etiology):   Recent stroke inhibiting PO intake     Signs and Symptoms (as evidenced by):   1) PO intake of 50-75%  2) Fluid intake of 2-3 cups daily (16-24 oz) " daily     Interventions/Recommendations (treatment strategy):  Above     Nutrition Diagnosis Status:   New     Nutrition Problem  Underweight     Related to (etiology):   Unknown     Signs and Symptoms (as evidenced by):   1) Pt BMI of 20.5 (low wt for elder >64 y/o)   2) Noticeable wasting in clavicle area     Interventions/Recommendations (treatment strategy):  Above     Nutrition Diagnosis Status:   New     Monitor and Evaluation    Food and Nutrient Intake: food and beverage intake  Food and Nutrient Adminstration: diet order  Knowledge/Beliefs/Attitudes: food and nutrition knowledge/skill  Anthropometric Measurements: weight  Biochemical Data, Medical Tests and Procedures: electrolyte and renal panel, lipid profile  Nutrition-Focused Physical Findings: overall appearance, skin     Malnutrition Assessment  Energy Intake: moderate energy intake  Skin (Micronutrient): edema, Esteban score=19 (12/19/19)  Hair/Scalp (Micronutrient): none  Teeth (Micronutrient): WDL   Micronutrient Evaluation Comments: (labs not taken )   Edema (Fluid Accumulation): 3-->moderate (12/19/19)   Fluid Accumulation Evaluation: moderate     Clavicle-mild muscle wasting   Protestant & Orbital area-moderate muscle & fat wasting     Nutrition Follow-Up    RD Follow-up?: Yes

## 2019-12-19 NOTE — SUBJECTIVE & OBJECTIVE
Past Medical History:   Diagnosis Date    Hypertension     Paroxysmal SVT (supraventricular tachycardia)        History reviewed. No pertinent surgical history.    Review of patient's allergies indicates:   Allergen Reactions    Contrast media     Iodine and iodide containing products        No current facility-administered medications on file prior to encounter.      Current Outpatient Medications on File Prior to Encounter   Medication Sig    aspirin (ECOTRIN) 81 MG EC tablet Take 81 mg by mouth once daily.    losartan (COZAAR) 25 MG tablet Take 25 mg by mouth once daily.    metoprolol tartrate (LOPRESSOR) 50 MG tablet Take 50 mg by mouth 2 (two) times daily.    pravastatin (PRAVACHOL) 20 MG tablet Take 20 mg by mouth once daily.     Family History     Problem Relation (Age of Onset)    Alcohol abuse Father    No Known Problems Mother        Tobacco Use    Smoking status: Former Smoker     Packs/day: 0.50     Years: 25.00     Pack years: 12.50     Last attempt to quit: 1966     Years since quittin.9    Smokeless tobacco: Never Used   Substance and Sexual Activity    Alcohol use: Never     Frequency: Never    Drug use: Never    Sexual activity: Not Currently     Review of Systems   Constitutional: Negative for activity change, appetite change, chills, fatigue and fever.   HENT: Negative for congestion, ear pain, mouth sores, postnasal drip, rhinorrhea, sinus pressure and sore throat.    Eyes: Negative for photophobia, discharge, itching and visual disturbance.   Respiratory: Negative for cough, chest tightness, shortness of breath and wheezing.    Cardiovascular: Negative for chest pain, palpitations and leg swelling.   Gastrointestinal: Negative for abdominal distention, abdominal pain, blood in stool, constipation, diarrhea, nausea and vomiting.   Endocrine: Negative for cold intolerance, polydipsia and polyuria.   Genitourinary: Negative for difficulty urinating, dysuria, flank pain,  frequency and urgency.   Musculoskeletal: Negative for arthralgias, back pain, joint swelling, neck pain and neck stiffness.   Skin: Negative for color change, pallor, rash and wound.   Allergic/Immunologic: Negative for environmental allergies and food allergies.   Neurological: Positive for speech difficulty. Negative for dizziness, syncope, facial asymmetry, weakness, light-headedness, numbness and headaches.   Hematological: Negative for adenopathy.   Psychiatric/Behavioral: Negative for agitation, confusion, sleep disturbance and suicidal ideas. The patient is not nervous/anxious.    All other systems reviewed and are negative.    Objective:     Vital Signs (Most Recent):  Temp: 97.6 °F (36.4 °C) (12/19/19 0145)  Pulse: 85 (12/19/19 0144)  Resp: 16 (12/19/19 0144)  BP: (!) 156/68 (12/19/19 0144)  SpO2: 96 % (12/19/19 0144) Vital Signs (24h Range):  Temp:  [96.8 °F (36 °C)-97.9 °F (36.6 °C)] 97.6 °F (36.4 °C)  Pulse:  [80-85] 85  Resp:  [16-17] 16  SpO2:  [96 %-97 %] 96 %  BP: (135-156)/(68-72) 156/68     Weight: 54.4 kg (120 lb)  Body mass index is 20.6 kg/m².    Physical Exam   Constitutional: She is oriented to person, place, and time. She appears well-developed and well-nourished.   HENT:   Head: Normocephalic and atraumatic.   Right Ear: External ear normal.   Left Ear: External ear normal.   Mouth/Throat: Oropharynx is clear and moist.   Eyes: Pupils are equal, round, and reactive to light. Conjunctivae and EOM are normal.   Neck: Normal range of motion. Neck supple. No JVD present.   Cardiovascular: Normal rate, regular rhythm, normal heart sounds and intact distal pulses.   No murmur heard.  Pulmonary/Chest: Effort normal and breath sounds normal. No stridor. No respiratory distress. She has no wheezes.   Lungs CTA bilaterally, currently on room air   Abdominal: Soft. Bowel sounds are normal.   Soft and nontender    Musculoskeletal: Normal range of motion. She exhibits no edema.   Neurological: She is  alert and oriented to person, place, and time. No sensory deficit.   Patient is following commands and responding appropriately but is noted to have moderate to severe aphasia. No focal neuro deficits noted. Strength equal in all extremities. NIHSS 2   Skin: Skin is warm and dry. Capillary refill takes less than 2 seconds.   Bruising noted to bilateral shins   Psychiatric: She has a normal mood and affect. Her behavior is normal.   Nursing note and vitals reviewed.        CRANIAL NERVES     CN III, IV, VI   Pupils are equal, round, and reactive to light.  Extraocular motions are normal.        Significant Labs:   BMP:   Recent Labs   Lab 12/18/19 2306         K 4.5      CO2 25   BUN 31*   CREATININE 0.8   CALCIUM 10.2     CBC:   Recent Labs   Lab 12/18/19 2306   WBC 8.56   HGB 14.2   HCT 45.3        TSH:   Recent Labs   Lab 12/18/19 2306   TSH 2.756     Urine Studies:   Recent Labs   Lab 12/19/19  0007   COLORU Yellow   APPEARANCEUA Hazy*   PHUR 6.0   SPECGRAV 1.020   PROTEINUA Trace*   GLUCUA Negative   KETONESU Negative   BILIRUBINUA Negative   OCCULTUA 1+*   NITRITE Positive*   UROBILINOGEN Negative   LEUKOCYTESUR 1+*   RBCUA 5*   WBCUA 60*   BACTERIA Many*   SQUAMEPITHEL 0     All pertinent labs within the past 24 hours have been reviewed.    Significant Imaging: I have reviewed and interpreted all pertinent imaging results/findings within the past 24 hours.

## 2019-12-19 NOTE — NURSING
Pt arrived to floor, vitals taken, oriented to unit, call light in reach, orders reconciled. RN to complete initial assessment.

## 2019-12-19 NOTE — PT/OT/SLP EVAL
Occupational Therapy   Evaluation and Discharge Note    Name: Vaishali Hoyt  MRN: [id number]  Admitting Diagnosis:  Stroke      Recommendations:     Discharge Recommendations: assisted living facility, home health speech therapy  Discharge Equipment Recommendations:  none  Barriers to discharge:  None    Assessment:     Vaishali Hoyt is a 89 y.o. female with a medical diagnosis of Stroke. Pt was receiving assistance for all self care but was feeding herself with supervision and set-up which she is currently able to do. OT repositioned pt to [facility] in upright, midline sitting for self feeding & ed pt and her son on safe positioning with self feeding to increase independence & reduce risk of aspiration with task.   At this time, patient is functioning at their prior level of function and does not require further acute OT services.     Plan:     During this hospitalization, patient does not require further acute OT services.  Please re-consult if situation changes.    · Plan of Care Reviewed with: patient, son    Subjective     Chief Complaint: None stated  Patient/Family Comments/goals: Son: To get her back to [facility].    Occupational Profile:  Living Environment: Pt lives in a care suite at [facility].  Previous level of function: She needed assistance for all self care, transfers and used a transport wheelchair for for mobility in the facility.  Roles and Routines: Pt enjoys BINGO.  Equipment Used at home:  shower chair, grab bar(transport chair)  Assistance upon Discharge: Nursing staff will help patient.    Pain/Comfort:  · Pain Rating 1: (did not rate)  · Location - Side 1: Right  · Location - Orientation 1: generalized  · Location 1: shoulder  · Pain Addressed 1: Cessation of Activity(Grimaced with shoulder flexion > 80 due to arthritis)  · Pain Rating Post-Intervention 1: 0/10    Patients cultural, spiritual, Congregational conflicts given the current situation:      Objective:     Communicated with: Ashley  "nurse prior to session.  Patient found HOB at 30* with bed alarm(son at bedside) upon OT entry to room.    General Precautions: Standard, aphasia, aspiration, fall   Orthopedic Precautions:N/A   Braces: N/A     Occupational Performance:    Bed Mobility:    · Patient completed Scooting/Bridging in supine with total assistance and two persons    Activities of Daily Living:  · Feeding:  supervision set-up to  pills from pill cup with B hands and then grasp cup of water from table and sip through a straw. HOB completely upright. No signs of aspiration.    Cognitive/Visual Perceptual:  Cognitive/Psychosocial Skills:     -       Oriented to: Person, Place and knew her son and stated "Yanely" when asked what year it was   -       Follows Commands/attention:Follows one-step commands  -       Communication: expressive aphasia and dysarthria  -       Mood/Affect/Coping skills/emotional control: Appropriate to situation, Cooperative and Pleasant  Visual/Perceptual:      -Intact  tracking, acuity, R/L discrimination, visual field and motor planning praxis      Physical Exam:  Postural examination/scapula alignment:    -       Rounded shoulders  -       Forward head  -       Kyphosis  Skin integrity: Bruising of B UE's  Sensation:    -       Intact  light/touch B UE's  Dominant hand:    -       right  Upper Extremity Range of Motion:     -       Right Upper Extremity: WFL except shoulder limited to 80* flexion 2/2 pain  -       Left Upper Extremity: WFL  Upper Extremity Strength:    -       Right Upper Extremity: 4/5  -       Left Upper Extremity: 4/5   Strength:    -       Right Upper Extremity: 4/5  -       Left Upper Extremity: 4/5  Fine Motor Coordination:    -       Intact  Gross motor coordination:   WFL    AMPAC 6 Click ADL:  AMPAC Total Score: 14    Treatment & Education:  OT ed pt and her son on OT role & discharge recommendations.  OT ed pt and son on upright, midline sitting for self feeding & ed pt and " her son on safe positioning with self feeding to increase independence & reduce risk of aspiration with task. Family voiced understanding.  OT ed pt & family on keeping HOB raised and sitting up in chair as pt will tolerate to counteract effects of bedrest.    Education:    Patient left HOB elevated with all lines intact, call button in reach, bed alarm on and son present    GOALS:   Multidisciplinary Problems     Occupational Therapy Goals     Not on file                History:     Past Medical History:   Diagnosis Date    Hypertension     Paroxysmal SVT (supraventricular tachycardia)        History reviewed. No pertinent surgical history.    Time Tracking:     OT Date of Treatment: 12/19/19  OT Start Time: 1145  OT Stop Time: 1204  OT Total Time (min): 19 min    Billable Minutes:Evaluation 10  Therapeutic Activity 9    PATRICK Purdy  12/19/2019

## 2019-12-19 NOTE — PLAN OF CARE
Intervention-nutrition education, general healthful diet      Recommendations     Recommendation/Intervention:   1) Change regular to cardiac diet   2) Pt weight taken weekly or as needed   3) Cont nutrition education as needed      Goals:   1) increase PO intake to 75% or greater (by next follow-up)   2) increase overall fluid intake (by next follow-up)   Nutrition Goal Status: new  Communication of RD Recs: (POC, sticky note)

## 2019-12-20 VITALS
RESPIRATION RATE: 16 BRPM | BODY MASS INDEX: 20.47 KG/M2 | OXYGEN SATURATION: 98 % | DIASTOLIC BLOOD PRESSURE: 65 MMHG | TEMPERATURE: 98 F | HEIGHT: 64 IN | WEIGHT: 119.94 LBS | HEART RATE: 91 BPM | SYSTOLIC BLOOD PRESSURE: 147 MMHG

## 2019-12-20 PROBLEM — C50.919 MALIGNANT NEOPLASM OF BREAST: Status: ACTIVE | Noted: 2019-12-20

## 2019-12-20 PROBLEM — M85.80 OSTEOPENIA: Status: ACTIVE | Noted: 2019-12-20

## 2019-12-20 PROBLEM — I77.9 DISORDER OF CAROTID ARTERY: Status: ACTIVE | Noted: 2019-12-20

## 2019-12-20 PROBLEM — I50.9 CONGESTIVE HEART FAILURE: Status: ACTIVE | Noted: 2019-12-20

## 2019-12-20 PROBLEM — F32.A DEPRESSIVE DISORDER: Status: ACTIVE | Noted: 2019-12-20

## 2019-12-20 PROBLEM — I51.9 HEART DISEASE: Status: ACTIVE | Noted: 2019-12-20

## 2019-12-20 PROBLEM — M19.90 ARTHRITIS: Status: ACTIVE | Noted: 2019-12-20

## 2019-12-20 PROBLEM — M79.89 LEFT LEG SWELLING: Status: RESOLVED | Noted: 2019-12-19 | Resolved: 2019-12-20

## 2019-12-20 LAB
ALBUMIN SERPL BCP-MCNC: 3.1 G/DL (ref 3.5–5.2)
ALP SERPL-CCNC: 89 U/L (ref 55–135)
ALT SERPL W/O P-5'-P-CCNC: 14 U/L (ref 10–44)
ANION GAP SERPL CALC-SCNC: 10 MMOL/L (ref 8–16)
AORTIC ROOT ANNULUS: 2.38 CM
AORTIC VALVE CUSP SEPERATION: 1.43 CM
ASCENDING AORTA: 3.01 CM
AST SERPL-CCNC: 19 U/L (ref 10–40)
AV INDEX (PROSTH): 1.04
AV MEAN GRADIENT: 2 MMHG
AV PEAK GRADIENT: 4 MMHG
AV VALVE AREA: 3.13 CM2
AV VELOCITY RATIO: 0.96
BASOPHILS # BLD AUTO: 0.02 K/UL (ref 0–0.2)
BASOPHILS NFR BLD: 0.3 % (ref 0–1.9)
BILIRUB SERPL-MCNC: 0.4 MG/DL (ref 0.1–1)
BSA FOR ECHO PROCEDURE: 1.57 M2
BUN SERPL-MCNC: 19 MG/DL (ref 10–30)
CALCIUM SERPL-MCNC: 9 MG/DL (ref 8.7–10.5)
CHLORIDE SERPL-SCNC: 103 MMOL/L (ref 95–110)
CO2 SERPL-SCNC: 25 MMOL/L (ref 23–29)
CREAT SERPL-MCNC: 0.7 MG/DL (ref 0.5–1.4)
CV ECHO LV RWT: 0.48 CM
DIFFERENTIAL METHOD: ABNORMAL
DOP CALC AO PEAK VEL: 1.03 M/S
DOP CALC AO VTI: 16.07 CM
DOP CALC LVOT AREA: 3 CM2
DOP CALC LVOT DIAMETER: 1.96 CM
DOP CALC LVOT PEAK VEL: 0.99 M/S
DOP CALC LVOT STROKE VOLUME: 50.3 CM3
DOP CALCLVOT PEAK VEL VTI: 16.68 CM
E WAVE DECELERATION TIME: 261.59 MSEC
E/A RATIO: 0.55
E/E' RATIO: 8.29 M/S
ECHO LV POSTERIOR WALL: 0.99 CM (ref 0.6–1.1)
EOSINOPHIL # BLD AUTO: 0.1 K/UL (ref 0–0.5)
EOSINOPHIL NFR BLD: 1.8 % (ref 0–8)
ERYTHROCYTE [DISTWIDTH] IN BLOOD BY AUTOMATED COUNT: 13.6 % (ref 11.5–14.5)
EST. GFR  (AFRICAN AMERICAN): >60 ML/MIN/1.73 M^2
EST. GFR  (NON AFRICAN AMERICAN): >60 ML/MIN/1.73 M^2
FRACTIONAL SHORTENING: 14 % (ref 28–44)
GLUCOSE SERPL-MCNC: 97 MG/DL (ref 70–110)
HCT VFR BLD AUTO: 40.5 % (ref 37–48.5)
HGB BLD-MCNC: 12.8 G/DL (ref 12–16)
IMM GRANULOCYTES # BLD AUTO: 0.02 K/UL (ref 0–0.04)
INTERVENTRICULAR SEPTUM: 0.87 CM (ref 0.6–1.1)
IVRT: 0.18 MSEC
LEFT ATRIUM SIZE: 4.16 CM
LEFT INTERNAL DIMENSION IN SYSTOLE: 3.51 CM (ref 2.1–4)
LEFT VENTRICLE MASS INDEX: 76 G/M2
LEFT VENTRICULAR INTERNAL DIMENSION IN DIASTOLE: 4.1 CM (ref 3.5–6)
LEFT VENTRICULAR MASS: 119.4 G
LV LATERAL E/E' RATIO: 5.27 M/S
LV SEPTAL E/E' RATIO: 19.33 M/S
LYMPHOCYTES # BLD AUTO: 2.2 K/UL (ref 1–4.8)
LYMPHOCYTES NFR BLD: 36.5 % (ref 18–48)
MAGNESIUM SERPL-MCNC: 1.5 MG/DL (ref 1.6–2.6)
MCH RBC QN AUTO: 30.4 PG (ref 27–31)
MCHC RBC AUTO-ENTMCNC: 31.6 G/DL (ref 32–36)
MCV RBC AUTO: 96 FL (ref 82–98)
MONOCYTES # BLD AUTO: 0.7 K/UL (ref 0.3–1)
MONOCYTES NFR BLD: 11.7 % (ref 4–15)
MV A" WAVE DURATION": 111 MSEC
MV MEAN GRADIENT: 2 MMHG
MV PEAK A VEL: 1.06 M/S
MV PEAK E VEL: 0.58 M/S
MV STENOSIS PRESSURE HALF TIME: 76 MS
MV VALVE AREA P 1/2 METHOD: 2.89 CM2
NEUTROPHILS # BLD AUTO: 3 K/UL (ref 1.8–7.7)
NEUTROPHILS NFR BLD: 49.4 % (ref 38–73)
NRBC BLD-RTO: 0 /100 WBC
PHOSPHATE SERPL-MCNC: 3.5 MG/DL (ref 2.7–4.5)
PISA TR MAX VEL: 1.63 M/S
PLATELET # BLD AUTO: 161 K/UL (ref 150–350)
PMV BLD AUTO: 10.2 FL (ref 9.2–12.9)
POTASSIUM SERPL-SCNC: 4.2 MMOL/L (ref 3.5–5.1)
PROT SERPL-MCNC: 6.4 G/DL (ref 6–8.4)
PULM VEIN A" WAVE DURATION": 125 MSEC
PV PEAK VELOCITY: 0.71 CM/S
RA PRESSURE: 8 MMHG
RBC # BLD AUTO: 4.21 M/UL (ref 4–5.4)
RIGHT VENTRICULAR END-DIASTOLIC DIMENSION: 2.66 CM
SODIUM SERPL-SCNC: 138 MMOL/L (ref 136–145)
TDI LATERAL: 0.11 M/S
TDI SEPTAL: 0.03 M/S
TDI: 0.07 M/S
TR MAX PG: 11 MMHG
TRICUSPID ANNULAR PLANE SYSTOLIC EXCURSION: 1.6 CM
TV REST PULMONARY ARTERY PRESSURE: 19 MMHG
WBC # BLD AUTO: 6.06 K/UL (ref 3.9–12.7)

## 2019-12-20 PROCEDURE — 84100 ASSAY OF PHOSPHORUS: CPT

## 2019-12-20 PROCEDURE — 97530 THERAPEUTIC ACTIVITIES: CPT

## 2019-12-20 PROCEDURE — 83735 ASSAY OF MAGNESIUM: CPT

## 2019-12-20 PROCEDURE — 85025 COMPLETE CBC W/AUTO DIFF WBC: CPT

## 2019-12-20 PROCEDURE — 94761 N-INVAS EAR/PLS OXIMETRY MLT: CPT

## 2019-12-20 PROCEDURE — 25000003 PHARM REV CODE 250: Performed by: NURSE PRACTITIONER

## 2019-12-20 PROCEDURE — 80053 COMPREHEN METABOLIC PANEL: CPT

## 2019-12-20 PROCEDURE — 36415 COLL VENOUS BLD VENIPUNCTURE: CPT

## 2019-12-20 PROCEDURE — 63600175 PHARM REV CODE 636 W HCPCS: Performed by: NURSE PRACTITIONER

## 2019-12-20 RX ORDER — AMOXICILLIN AND CLAVULANATE POTASSIUM 500; 125 MG/1; MG/1
1 TABLET, FILM COATED ORAL 2 TIMES DAILY
Qty: 10 TABLET | Refills: 0 | Status: SHIPPED | OUTPATIENT
Start: 2019-12-20 | End: 2019-12-25

## 2019-12-20 RX ORDER — DOCUSATE SODIUM 100 MG/1
100 CAPSULE, LIQUID FILLED ORAL 2 TIMES DAILY PRN
Qty: 30 CAPSULE | Refills: 1 | Status: SHIPPED | OUTPATIENT
Start: 2019-12-20

## 2019-12-20 RX ORDER — CLOPIDOGREL BISULFATE 75 MG/1
75 TABLET ORAL DAILY
Qty: 30 TABLET | Refills: 11 | Status: ON HOLD | OUTPATIENT
Start: 2019-12-20 | End: 2020-03-02 | Stop reason: HOSPADM

## 2019-12-20 RX ADMIN — ACETAMINOPHEN 650 MG: 325 TABLET ORAL at 08:12

## 2019-12-20 RX ADMIN — PANTOPRAZOLE SODIUM 40 MG: 40 TABLET, DELAYED RELEASE ORAL at 08:12

## 2019-12-20 RX ADMIN — CEFTRIAXONE 1 G: 1 INJECTION, SOLUTION INTRAVENOUS at 02:12

## 2019-12-20 RX ADMIN — PRAVASTATIN SODIUM 20 MG: 10 TABLET ORAL at 08:12

## 2019-12-20 RX ADMIN — ASPIRIN 325 MG: 325 TABLET, DELAYED RELEASE ORAL at 08:12

## 2019-12-20 RX ADMIN — ACETAMINOPHEN 650 MG: 325 TABLET ORAL at 05:12

## 2019-12-20 NOTE — PT/OT/SLP PROGRESS
Physical Therapy Treatment    Patient Name:  Vaishali Hoyt   MRN:  [id number]    Recommendations:     Discharge Recommendations:  home health PT   Discharge Equipment Recommendations: none   Barriers to discharge: None    Assessment:     Vaishali Hoyt is a 89 y.o. female admitted with a medical diagnosis of Stroke.  She presents with the following impairments/functional limitations:  weakness, impaired functional mobilty, decreased safety awareness, impaired cognition, impaired self care skills, impaired endurance. Patient ambulated 80' with RW and Mod A x 2 due to fwd flexed posture. She has low tolerance to activity and poor cognition. Continue with PT and POC.    Rehab Prognosis: Fair; patient would benefit from acute skilled PT services to address these deficits and reach maximum level of function.    Recent Surgery: * No surgery found *      Plan:     During this hospitalization, patient to be seen 6 x/week to address the identified rehab impairments via gait training, therapeutic activities, therapeutic exercises and progress toward the following goals:    · Plan of Care Expires:  01/10/20    Subjective     Chief Complaint: none  Patient/Family Comments/goals: none stated  Pain/Comfort:  · Pain Rating 1: 0/10  · Pain Addressed 1: Pre-medicate for activity  · Pain Rating Post-Intervention 1: 0/10      Objective:     Communicated with YAHAIRA Ramirez prior to session.  Patient found HOB elevated with bed alarm upon PT entry to room.     General Precautions: Standard, aphasia, fall   Orthopedic Precautions:N/A   Braces: N/A     Functional Mobility:  · Bed Mobility:     · Rolling Right: moderate assistance  · Scooting: moderate assistance and maximal assistance  · Supine to Sit: moderate assistance and of 2 persons  · Transfers:     · Sit to Stand:  minimum assistance with rolling walker  · Bed to Chair: moderate assistance and of 2 persons with  rolling walker  using  Stand Pivot  · Toilet Transfer: moderate assistance  and of 2 persons with  rolling walker  using  Stand Pivot  · Gait: 80' with RW and Mod A with vcs for fwd flexed posture and control of RW      AM-PAC 6 CLICK MOBILITY          Therapeutic Activities and Exercises:   Patient sat on the EOB to increase spinal orientation and proprioception and improve sitting balance. Toilet transfer, gait training, pt transferred to chair.     Patient left up in chair with chair alarm on..    GOALS:   Multidisciplinary Problems     Physical Therapy Goals        Problem: Physical Therapy Goal    Goal Priority Disciplines Outcome Goal Variances Interventions   Physical Therapy Goal     PT, PT/OT Ongoing, Progressing     Description:  Goals to be met by: 01-     Patient will increase functional independence with mobility by performin. Supine to sit with MInimal Assistance  2. Sit to stand transfer with Minimal Assistance  3. Bed to chair transfer with Minimal Assistance using Rolling Walker  4. Gait  x 50 feet with Minimal Assistance using Rolling Walker.   5. Lower extremity exercise program x20 reps                     Time Tracking:     PT Received On: 19  PT Start Time: 0958     PT Stop Time: 1021  PT Total Time (min): 23 min     Billable Minutes: Therapeutic Activity 23    Treatment Type: Treatment  PT/PTA: PTA     PTA Visit Number: 1     Tova Vasquez, PTA  2019

## 2019-12-20 NOTE — PLAN OF CARE
Patient AAO, VSS. Pt verbalized understanding of POC. IV fluids Infusing. Telemetry monitoring in place. KRISTIAN or SLP completed . Regular diet ordered.  Pt having aphasia. UTI being treated with rocephin. Pt LOC being monitored by NIH stroke scale. Purposeful hourly/q2hr rounding done during shift to promote patient safety. Patient free from falls and injury during shift.  Bed in lowest position, brakes locked, and call light within each.  Will continue to monitor.

## 2019-12-20 NOTE — CONSULTS
[facility]  Neurology  Consult Note    Patient Name: Vaishali Hoyt  MRN: [id number]  Admission Date: 12/18/2019  Hospital Length of Stay: 0 days  Code Status: DNR   Attending Provider: Rodolfo Askew MD   Consulting Provider: Dr. Kyle Sanders  Consulting Practitioner: Bárbara Alan NP   Primary Care Physician: William Escalante MD  Principal Problem:Stroke    Consults  Subjective:     Chief Complaint:  Slurred speech since 5:30PM yesterday    HPI:   Vaishali Hoyt is a 89 y.o. female with a PMHx of HTN and SVT who presented to the ED for slurred speech that began at unknown time. Her last known normal was at 5pm per EMS report. The patient is able to give only limited history due to aphasia. Her son reports she lives in an assisted living facility. He states she was at baseline around 5pm then she took a nap, and she called for help with getting to the restroom and the tech at the assisted living facility noticed her speech was slurred. EMS was called. The patient denies any aggravating or alleviating factors. She denies any associated symptoms. She denies any fever, chills, SOB, chest pain, cough, N/V/D, numbness/tingling, focal neuro deficits, or any urinary symptoms.  Her work up in the ED was significant for a nitrite positive UTI, CT head was negative, and chest xray with no acute process. A stroke code was activated in the ED. The patient will be placed in observation under hospital medicine for further evaluation and work up. Neurology will be consulted.    Neurology Consult:  Patient was seen, examined, and plan of care discussed with Dr. Kyle Sanders.  Patient was brought into ER via EMS due to stroke symptoms. Per her son, she went to rest and when she woke up around 5:00pm she was slurring her speech. They denied any facial droop or numbness or tingling in her face. We with continue with TIA/CVA workup. Patient with previous h/o stroke in the right frontal cortical area of the brain.        Past Medical History:   Diagnosis Date    Hypertension     Paroxysmal SVT (supraventricular tachycardia)        History reviewed. No pertinent surgical history.    Review of patient's allergies indicates:   Allergen Reactions    Contrast media     Iodine and iodide containing products        No current facility-administered medications on file prior to encounter.      Current Outpatient Medications on File Prior to Encounter   Medication Sig    aspirin (ECOTRIN) 81 MG EC tablet Take 81 mg by mouth once daily.    losartan (COZAAR) 25 MG tablet Take 25 mg by mouth once daily.    metoprolol tartrate (LOPRESSOR) 50 MG tablet Take 50 mg by mouth 2 (two) times daily.    pravastatin (PRAVACHOL) 20 MG tablet Take 20 mg by mouth once daily.      Family History     Problem Relation (Age of Onset)    Alcohol abuse Father    No Known Problems Mother        Tobacco Use    Smoking status: Former Smoker     Packs/day: 0.50     Years: 25.00     Pack years: 12.50     Last attempt to quit: 1966     Years since quittin.9    Smokeless tobacco: Never Used   Substance and Sexual Activity    Alcohol use: Never     Frequency: Never    Drug use: Never    Sexual activity: Not Currently     Review of Systems   Constitutional: Negative for activity change, appetite change, chills, fatigue and fever.   HENT: Negative for congestion, ear pain, mouth sores, postnasal drip, rhinorrhea, sinus pressure and sore throat.    Eyes: Negative for photophobia, discharge, itching and visual disturbance.   Respiratory: Negative for cough, chest tightness, shortness of breath and wheezing.    Cardiovascular: Negative for chest pain, palpitations and leg swelling.   Gastrointestinal: Negative for abdominal distention, abdominal pain, blood in stool, constipation, diarrhea, nausea and vomiting.   Endocrine: Negative for cold intolerance, polydipsia and polyuria.   Genitourinary: Negative for difficulty urinating, dysuria, flank  pain, frequency and urgency.   Musculoskeletal: Negative for arthralgias, back pain, joint swelling, neck pain and neck stiffness.   Skin: Negative for color change, pallor, rash and wound.   Allergic/Immunologic: Negative for environmental allergies and food allergies.   Neurological: Positive for speech difficulty. Negative for dizziness, syncope, facial asymmetry, weakness, light-headedness, numbness and headaches.   Hematological: Negative for adenopathy.   Psychiatric/Behavioral: Negative for agitation, confusion, sleep disturbance and suicidal ideas. The patient is not nervous/anxious.    All other systems reviewed and are negative    Objective:     Vital Signs (Most Recent):  Temp: 97.8 °F (36.6 °C) (12/19/19 2116)  Pulse: 85 (12/19/19 2116)  Resp: 16 (12/19/19 2116)  BP: (!) 158/70 (12/19/19 2116)  SpO2: 97 % (12/19/19 2116) Vital Signs (24h Range):  Temp:  [93.4 °F (34.1 °C)-97.8 °F (36.6 °C)] 97.8 °F (36.6 °C)  Pulse:  [85-96] 85  Resp:  [13-18] 16  SpO2:  [93 %-99 %] 97 %  BP: (156-174)/(68-77) 158/70     Weight: 54.4 kg (119 lb 14.9 oz)  Body mass index is 20.59 kg/m².    Physical Exam  Constitutional: Negative for activity change, appetite change, chills, fatigue and fever.   HENT: Negative for congestion, ear pain, mouth sores, postnasal drip, rhinorrhea, sinus pressure and sore throat.    Eyes: Negative for photophobia, discharge, itching and visual disturbance.   Respiratory: Negative for cough, chest tightness, shortness of breath and wheezing.    Cardiovascular: Negative for chest pain, palpitations and leg swelling.   Gastrointestinal: Negative for abdominal distention, abdominal pain, blood in stool, constipation, diarrhea, nausea and vomiting.   Endocrine: Negative for cold intolerance, polydipsia and polyuria.   Genitourinary: Negative for difficulty urinating, dysuria, flank pain, frequency and urgency.   Musculoskeletal: Negative for arthralgias, back pain, joint swelling, neck pain and neck  stiffness.   Skin: Negative for color change, pallor, rash and wound.   Allergic/Immunologic: Negative for environmental allergies and food allergies.   Neurological: Positive for speech difficulty. Negative for dizziness, syncope, facial asymmetry, weakness, light-headedness, numbness and headaches.   Hematological: Negative for adenopathy.   Psychiatric/Behavioral: Negative for agitation, confusion, sleep disturbance and suicidal ideas. The patient is not nervous/anxious.    All other systems reviewed and are negative       Significant Labs:  Lab Results   Component Value Date    TSH 2.756 12/18/2019     Lab Results   Component Value Date    LDLCALC 91.4 12/18/2019     Lab Results   Component Value Date    CREATININE 0.8 12/18/2019     Lab Results   Component Value Date    WBC 8.56 12/18/2019    HGB 14.2 12/18/2019    HCT 45.3 12/18/2019    MCV 98 12/18/2019     12/18/2019     Significant Imaging:   MRI Brain w/o Contrast  1. Small focus of acute posterosuperior left frontal cortical ischemia/infarction.  2. Age-appropriate cerebral volume loss and chronic microvascular ischemic changes within the periventricular and subcortical white matter of the supratentorial brain and within the almaz.  3. Minimal ethmoid sinus disease.  4. Nonspecific well-circumscribed 18 x 26 mm cystic structure present along the lateral border of the left adenoid tonsils.  Consider additional evaluation with a contrast enhanced study as deemed clinically appropriate..  This may represent a retention cyst.  This report was flagged in Epic as abnormal.    MRA Brain w/o Contrast  Pending    Carotid Ultrasound  No evidence of a hemodynamically significant carotid bifurcation stenosis.    TTE with Bubble  No evidence of a hemodynamically significant carotid bifurcation stenosis.    Assessment and Plan:    . R/O CVA vs. TIA  -MRI Brain w/o Contrast-complete  -MRA Brain w/o Contrast-pending  -Carotid Ultrasound-done  -TTE with bubble  study-pending  -Continue  mg and Statin  -PT/OT/ST       Active Diagnoses:    Diagnosis Date Noted POA    PRINCIPAL PROBLEM:  Stroke [I63.9] 12/19/2019 Yes    Left leg swelling [M79.89] 12/19/2019 Yes    Essential hypertension [I10] 12/19/2019 Yes    Aphasia [R47.01] 12/19/2019 Yes    Acute cystitis without hematuria [N30.00] 12/19/2019 Yes      Problems Resolved During this Admission:       VTE Risk Mitigation (From admission, onward)         Ordered     IP VTE HIGH RISK PATIENT  Once      12/19/19 0115     Place sequential compression device  Until discontinued      12/19/19 0115     Place WESLEY hose  Until discontinued      12/19/19 0115     Reason for No Pharmacological VTE Prophylaxis  Once     Question:  Reasons:  Answer:  Physician Provided (leave comment)    12/19/19 0115              [phone] 2 weeks from discharge.     Stroke education was provided including stroke risk factors modification and any acute neurological changes including weakness, confusion, visual changes to come straight to the ER.    All side effects of new medications were discussed with patient and/or next of kin and all questions were answered.                                                           Patient is clear for discharge from neurology standpoint. We will sign off for now. Thank you for the consult.      Bárbara Alan, PEREZ  Neurology  [facility] I, Dr. Kyle Sanders, have personally seen and examined the patient with my advanced provider and agree with above. I personally did a focused exam, and reviewed all necessary clinical information. I discussed my management plan with my NP and agree with above. New infarct.

## 2019-12-20 NOTE — PLAN OF CARE
Pt is cleared for discharge. Cm faxed report to Debbie [phone] at [facility].  Pending N- selection.       12/20/19 1307   Final Note   Assessment Type Final Discharge Note   Anticipated Discharge Disposition Home-Health

## 2019-12-20 NOTE — PROGRESS NOTES
[facility]  Neurology  Progress Note    Patient Name: Vaishali Hoyt  MRN: [id number]  Admission Date: 12/18/2019  Hospital Length of Stay: 1 days  Code Status: DNR   Attending Provider: Rodolfo Askew MD   Consulting Provider: Dr. Kyle Sanders  Consulting Practitioner: Bárbara Alan NP   Primary Care Physician: William Escalante MD  Principal Problem:Stroke    Consults  Subjective:     Chief Complaint:  Slurred speech since 5:30PM yesterday    HPI:   Vaishali Hoyt is a 89 y.o. female with a PMHx of HTN and SVT who presented to the ED for slurred speech that began at unknown time. Her last known normal was at 5pm per EMS report. The patient is able to give only limited history due to aphasia. Her son reports she lives in an assisted living facility. He states she was at baseline around 5pm then she took a nap, and she called for help with getting to the restroom and the tech at the assisted living facility noticed her speech was slurred. EMS was called. The patient denies any aggravating or alleviating factors. She denies any associated symptoms. She denies any fever, chills, SOB, chest pain, cough, N/V/D, numbness/tingling, focal neuro deficits, or any urinary symptoms.  Her work up in the ED was significant for a nitrite positive UTI, CT head was negative, and chest xray with no acute process. A stroke code was activated in the ED. The patient will be placed in observation under hospital medicine for further evaluation and work up. Neurology will be consulted.    Neurology Consult:  Patient was seen, examined, and plan of care discussed with Dr. Kyle Sanders.  Patient was brought into ER via EMS due to stroke symptoms. Per her son, she went to rest and when she woke up around 5:00pm she was slurring her speech. They denied any facial droop or numbness or tingling in her face. Patient with previous h/o stroke in the right frontal cortical area of the brain. MRI showed small left frontal cortical  ischemic infarct. Patient and family aware.  Patient is back to baseline. Hospital medicine is changing ASA to Plavix. She is stable from neurology standpoint.       Past Medical History:   Diagnosis Date    Hypertension     Paroxysmal SVT (supraventricular tachycardia)        History reviewed. No pertinent surgical history.    Review of patient's allergies indicates:   Allergen Reactions    Contrast media     Iodine and iodide containing products        No current facility-administered medications on file prior to encounter.      Current Outpatient Medications on File Prior to Encounter   Medication Sig    aspirin (ECOTRIN) 81 MG EC tablet Take 81 mg by mouth once daily.    losartan (COZAAR) 25 MG tablet Take 25 mg by mouth once daily.    metoprolol tartrate (LOPRESSOR) 50 MG tablet Take 50 mg by mouth 2 (two) times daily.    pravastatin (PRAVACHOL) 20 MG tablet Take 20 mg by mouth once daily.      Family History     Problem Relation (Age of Onset)    Alcohol abuse Father    No Known Problems Mother        Tobacco Use    Smoking status: Former Smoker     Packs/day: 0.50     Years: 25.00     Pack years: 12.50     Last attempt to quit: 1966     Years since quittin.9    Smokeless tobacco: Never Used   Substance and Sexual Activity    Alcohol use: Never     Frequency: Never    Drug use: Never    Sexual activity: Not Currently     Review of Systems   Constitutional: Negative for activity change, appetite change, chills, fatigue and fever.   HENT: Negative for congestion, ear pain, mouth sores, postnasal drip, rhinorrhea, sinus pressure and sore throat.    Eyes: Negative for photophobia, discharge, itching and visual disturbance.   Respiratory: Negative for cough, chest tightness, shortness of breath and wheezing.    Cardiovascular: Negative for chest pain, palpitations and leg swelling.   Gastrointestinal: Negative for abdominal distention, abdominal pain, blood in stool, constipation, diarrhea,  nausea and vomiting.   Endocrine: Negative for cold intolerance, polydipsia and polyuria.   Genitourinary: Negative for difficulty urinating, dysuria, flank pain, frequency and urgency.   Musculoskeletal: Negative for arthralgias, back pain, joint swelling, neck pain and neck stiffness.   Skin: Negative for color change, pallor, rash and wound.   Allergic/Immunologic: Negative for environmental allergies and food allergies.   Neurological: Positive for speech difficulty. Negative for dizziness, syncope, facial asymmetry, weakness, light-headedness, numbness and headaches.   Hematological: Negative for adenopathy.   Psychiatric/Behavioral: Negative for agitation, confusion, sleep disturbance and suicidal ideas. The patient is not nervous/anxious.    All other systems reviewed and are negative    Objective:     Vital Signs (Most Recent):  Temp: 98.5 °F (36.9 °C) (12/20/19 0348)  Pulse: 81 (12/20/19 0348)  Resp: 16 (12/20/19 0348)  BP: (!) 109/53 (12/20/19 0348)  SpO2: 96 % (12/20/19 0348) Vital Signs (24h Range):  Temp:  [96 °F (35.6 °C)-98.5 °F (36.9 °C)] 98.5 °F (36.9 °C)  Pulse:  [81-96] 81  Resp:  [16-18] 16  SpO2:  [94 %-98 %] 96 %  BP: (109-174)/(53-77) 109/53     Weight: 54.4 kg (119 lb 14.9 oz)  Body mass index is 20.59 kg/m².    Physical Exam  Constitutional: Negative for activity change, appetite change, chills, fatigue and fever.   HENT: Negative for congestion, ear pain, mouth sores, postnasal drip, rhinorrhea, sinus pressure and sore throat.    Eyes: Negative for photophobia, discharge, itching and visual disturbance.   Respiratory: Negative for cough, chest tightness, shortness of breath and wheezing.    Cardiovascular: Negative for chest pain, palpitations and leg swelling.   Gastrointestinal: Negative for abdominal distention, abdominal pain, blood in stool, constipation, diarrhea, nausea and vomiting.   Endocrine: Negative for cold intolerance, polydipsia and polyuria.   Genitourinary: Negative for  difficulty urinating, dysuria, flank pain, frequency and urgency.   Musculoskeletal: Negative for arthralgias, back pain, joint swelling, neck pain and neck stiffness.   Skin: Negative for color change, pallor, rash and wound.   Allergic/Immunologic: Negative for environmental allergies and food allergies.   Neurological: Positive for speech difficulty. Negative for dizziness, syncope, facial asymmetry, weakness, light-headedness, numbness and headaches.   Hematological: Negative for adenopathy.   Psychiatric/Behavioral: Negative for agitation, confusion, sleep disturbance and suicidal ideas. The patient is not nervous/anxious.    All other systems reviewed and are negative       Significant Labs:  Lab Results   Component Value Date    TSH 2.756 12/18/2019     Lab Results   Component Value Date    LDLCALC 91.4 12/18/2019     Lab Results   Component Value Date    CREATININE 0.7 12/20/2019     Lab Results   Component Value Date    WBC 8.56 12/18/2019    HGB 14.2 12/18/2019    HCT 45.3 12/18/2019    MCV 98 12/18/2019     12/18/2019     Significant Imaging:   MRI Brain w/o Contrast  1. Small focus of acute posterosuperior left frontal cortical ischemia/infarction.  2. Age-appropriate cerebral volume loss and chronic microvascular ischemic changes within the periventricular and subcortical white matter of the supratentorial brain and within the almaz.  3. Minimal ethmoid sinus disease.  4. Nonspecific well-circumscribed 18 x 26 mm cystic structure present along the lateral border of the left adenoid tonsils.  Consider additional evaluation with a contrast enhanced study as deemed clinically appropriate..  This may represent a retention cyst.  This report was flagged in Epic as abnormal.    MRA Brain w/o Contrast  No MRA evidence of intracranial high-grade stenosis, large vessel occlusion, or aneurysm.    Carotid Ultrasound  No evidence of a hemodynamically significant carotid bifurcation stenosis.    TTE with  Bubble  · .Concentric left ventricular remodeling.  · The mean diastolic gradient across the mitral valve is 2 mmHg at a heart rate of bpm.  · Moderately decreased left ventricular systolic function. The estimated ejection fraction is 30%  · Grade I (mild) left ventricular diastolic dysfunction consistent with impaired relaxation.  · Moderate left ventricular enlargement.  · Normal right ventricular systolic function.  · Mild left atrial enlargement.  · Mild tricuspid regurgitation.  · Intermediate central venous pressure (8 mm Hg).  · The estimated PA systolic pressure is 19 mm Hg     MILD- MODERATE  DILATED CARDIOmyopathy  EF 30%    no  pfo - bubble study negative     Assessment and Plan:    . R/O CVA vs. TIA  -MRI Brain w/o Contrast-complete  -MRA Brain w/o Contrast  -Carotid Ultrasound  -TTE with bubble study  -Change ASA to Plavix and continue Statin  -PT/OT/ST       Active Diagnoses:    Diagnosis Date Noted POA    PRINCIPAL PROBLEM:  Stroke [I63.9] 12/19/2019 Yes    Left leg swelling [M79.89] 12/19/2019 Yes    Essential hypertension [I10] 12/19/2019 Yes    Aphasia [R47.01] 12/19/2019 Yes    Acute cystitis without hematuria [N30.00] 12/19/2019 Yes      Problems Resolved During this Admission:       VTE Risk Mitigation (From admission, onward)         Ordered     IP VTE HIGH RISK PATIENT  Once      12/19/19 0115     Place sequential compression device  Until discontinued      12/19/19 0115     Place WESLEY hose  Until discontinued      12/19/19 0115     Reason for No Pharmacological VTE Prophylaxis  Once     Question:  Reasons:  Answer:  Physician Provided (leave comment)    12/19/19 0115              [phone] [facility] 2 weeks from discharge.     Stroke education was provided including stroke risk factors modification and any acute neurological changes including weakness, confusion, visual changes to come straight to the ER.    All side effects of new  medications were discussed with patient and/or next of kin and all questions were answered.                                                           Patient is clear for discharge from neurology standpoint. We will sign off for now. Thank you for the consult.      Bárbara Alan, PEREZ  Neurology  [facility] I, Dr. Kyle Sanders, have personally seen and examined the patient with my advanced provider and agree with above. I personally did a focused exam, and reviewed all necessary clinical information. I discussed my management plan with my NP and agree with above. Switch to plavix

## 2019-12-20 NOTE — DISCHARGE INSTRUCTIONS
Thank you for choosing [facility] for your medical care. The primary doctor who is taking care of you at the time of your discharge is Rodolfo Askew MD.     You were admitted to the hospital with Stroke.     Please note your discharge instructions, including diet/activity restrictions, follow-up appointments, and medication changes.  If you have any questions about your medical issues, prescriptions, or any other questions, please feel free to contact the [facility] Medicine Dept at [phone] and we will help.    If you are previously with Home health, outpatient PT/OT or under a therapy program, you are cleared to return to those programs.    Please direct all long term medication refills and follow up to your primary care provider, William Escalante MD. Thank you again for letting us take care of your health care needs.    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

## 2019-12-20 NOTE — PLAN OF CARE
SW spoke to [facility] with [employer] [phone] opt 3 regarding home health agency.  Per rep, patient authorized and accepted with [facility].  SULTANA sent patient information to [facility] through [facility] system.       12/20/19 3278   Post-Acute Status   Post-Acute Authorization Home Health/Hospice   Home Health/Hospice Status Set-up Complete   Discharge Delays None known at this time

## 2019-12-20 NOTE — PLAN OF CARE
SW met with patient and family member at bedside regarding home health.  Son signed patient choice disclosure choosing to allow placement with first available provider.  SULTANA sent patient information to [facility] for authorization and acceptance via [employer] system.       12/20/19 1246   Post-Acute Status   Post-Acute Authorization Home Health/Hospice   Home Health/Hospice Status Referrals Sent   Patient choice form signed by patient/caregiver List with quality metrics by geographic area provided

## 2019-12-20 NOTE — PLAN OF CARE
12/20/19 0942   PRE-TX-O2   O2 Device (Oxygen Therapy) room air   SpO2 96 %   Pulse Oximetry Type Intermittent   $ Pulse Oximetry - Multiple Charge Pulse Oximetry - Multiple

## 2019-12-20 NOTE — NURSING
Discharge pt to [facility] via facility transport. Discharge instructions given to pt. Pt verbalized understanding. IV removed. Nurse relinquished care.

## 2019-12-20 NOTE — PLAN OF CARE
Florentino spoke with Debbei-Director of Nursing at [facility].  Pt does not need a discharge assessment from the.  However, She requested discharge orders and MAR with physician signature.  Cm will fax their informatiion to [phone].       12/20/19 2034   Discharge Reassessment   Assessment Type Discharge Planning Reassessment   Provided patient/caregiver education on the expected discharge date and the discharge plan Yes   Do you have any problems affording any of your prescribed medications? No   Discharge Plan A Assisted Living

## 2019-12-22 LAB — BACTERIA UR CULT: ABNORMAL

## 2019-12-22 NOTE — DISCHARGE SUMMARY
[facility] Medicine  Discharge Summary      Patient Name: Vaishali Hoyt  MRN: [id number]  Admission Date: 12/18/2019  Hospital Length of Stay: 1 days  Discharge Date and Time:  12/21/2019 11:52 PM  Attending Physician: No att. providers found   Discharging Provider: Rodolfo Askew MD  Primary Care Provider: William Escalante MD      HPI:   Vaishali Hoyt is a 89 y.o. female with a PMHx of HTN and SVT who presented to the ED for slurred speech that began at unknown time. Her last known normal was at 5pm per EMS report. The patient is able to give only limited history due to aphasia. Her son reports she lives in an assisted living facility. He states she was at baseline around 5pm then she took a nap, and she called for help with getting to the restroom and the tech at the assisted living facility noticed her speech was slurred. EMS was called. The patient denies any aggravating or alleviating factors. She denies any associated symptoms. She denies any fever, chills, SOB, chest pain, cough, N/V/D, numbness/tingling, focal neuro deficits, or any urinary symptoms.  Her work up in the ED was significant for a nitrite positive UTI, CT head was negative, and chest xray with no acute process. A stroke code was activated in the ED. The patient will be placed in observation under hospital medicine for further evaluation and work up. Neurology will be consulted.    * No surgery found *      Hospital Course:   Patient was admitted for aphasia and stroke workup. Her work up in the ED was significant for a nitrite positive UTI, CT head was negative, and chest xray with no acute process. A stroke code was activated in the ED. Neurology was consulted.  MRI showed small left frontal cortical ischemic infarct.  Patient 's expressive aphasia persisted and speech was working with the patient. Recommended home health with PT, OT and speech. Switched from ASA to Plavix. She is stable for discharge       Consults:   Consults (From admission, onward)        Status Ordering Provider     Inpatient consult to Registered Dietitian/Nutritionist  Once     Provider:  (Not yet assigned)    Completed EVON SOTO     Inpatient consult to Social Work  Once     Provider:  (Not yet assigned)    Completed MINA SANTIAGO     Inpatient consult to Telemedicine-Stroke  Once     Provider:  Ariel Ruiz MD    Completed EDER BELLE     IP consult to case management/social work  Once     Provider:  (Not yet assigned)    Completed EVON SOTO          No new Assessment & Plan notes have been filed under this hospital service since the last note was generated.  Service: Hospital Medicine    Final Active Diagnoses:    Diagnosis Date Noted POA    PRINCIPAL PROBLEM:  Stroke [I63.9] 12/19/2019 Yes    Essential hypertension [I10] 12/19/2019 Yes    Aphasia [R47.01] 12/19/2019 Yes    Acute cystitis without hematuria [N30.00] 12/19/2019 Yes      Problems Resolved During this Admission:    Diagnosis Date Noted Date Resolved POA    Left leg swelling [M79.89] 12/19/2019 12/20/2019 Yes       Discharged Condition: stable    Disposition: Home or Self Care    Follow Up:  Follow-up Information     William Escalante MD.    Specialty:  Internal Medicine  Contact information:  [address] [id number] [phone]             [facility]    Specialties:  DME Provider, Home Health Services  Why:  Home Health  Contact information:  [address] [place] [id number]  [phone]                 Patient Instructions:      Referral to Home health   Referral Priority: Routine Referral Type: Home Health   Referral Reason: Specialty Services Required   Requested Specialty: Home Health Services   Number of Visits Requested: 1     Diet Cardiac   Order Comments: See Stroke Patient Education Guide Booklet for details.     Call 911 for any of the following:   Order Comments: Call 911  right  away if any of the following warning signs come on suddenly, even if the symptoms only last for a few minutes. With stroke, timing is very important.   - Warning Signs of Stroke:  - Weakness: You may feel a sudden weakness, tingling or loss of feeling on one side of your face or body.  - Vision Problems: You may have sudden double vision or trouble seeing in one or both eyes.  - Speech Problems: You may have sudden trouble talking, slured speech, or problems understanding others.  - Headache: You may have sudden, severe headache.  - Movement Problems: You may experience dizziness, a feeling of spinning, a loss of balance, a feeling of falling or blackouts.       Significant Diagnostic Studies: Labs:   BMP:   Recent Labs   Lab 12/20/19  0512   GLU 97      K 4.2      CO2 25   BUN 19   CREATININE 0.7   CALCIUM 9.0   MG 1.5*       Pending Diagnostic Studies:     Procedure Component Value Units Date/Time    Echo Color Flow Doppler? Yes; Bubble Contrast? Yes [[id number]]     Order Status:  Sent Lab Status:  No result          Medications:  Reconciled Home Medications:      Medication List      START taking these medications    amoxicillin-clavulanate 500-125mg 500-125 mg Tab  Commonly known as:  AUGMENTIN  Take 1 tablet (500 mg total) by mouth 2 (two) times daily. for 5 days     clopidogrel 75 mg tablet  Commonly known as:  PLAVIX  Take 1 tablet (75 mg total) by mouth once daily.     docusate sodium 100 MG capsule  Commonly known as:  Colace  Take 1 capsule (100 mg total) by mouth 2 (two) times daily as needed for Constipation.        CONTINUE taking these medications    losartan 25 MG tablet  Commonly known as:  COZAAR  Take 25 mg by mouth once daily.     metoprolol tartrate 50 MG tablet  Commonly known as:  LOPRESSOR  Take 50 mg by mouth 2 (two) times daily.     pravastatin 20 MG tablet  Commonly known as:  PRAVACHOL  Take 20 mg by mouth once daily.        STOP taking these medications    aspirin 81 MG EC  tablet  Commonly known as:  ECOTRIN            Indwelling Lines/Drains at time of discharge:   Lines/Drains/Airways     None                 Time spent on the discharge of patient: 60 minutes  Patient was seen and examined on the date of discharge and determined to be suitable for discharge.         Rodolfo Askew MD  Department of Hospital Medicine  [facility]

## 2019-12-22 NOTE — HOSPITAL COURSE
Patient was admitted for aphasia and stroke workup. Her work up in the ED was significant for a nitrite positive UTI, CT head was negative, and chest xray with no acute process. A stroke code was activated in the ED. Neurology was consulted.  MRI showed small left frontal cortical ischemic infarct.  Patient's expressive aphasia persisted and speech was working with the patient. Recommended home health with PT, OT and speech. Switched from ASA to Plavix. She is stable for discharge

## 2019-12-23 ENCOUNTER — TELEPHONE (OUTPATIENT)
Dept: MEDSURG UNIT | Facility: HOSPITAL | Age: 84
End: 2019-12-23

## 2019-12-24 ENCOUNTER — PATIENT OUTREACH (OUTPATIENT)
Dept: ADMINISTRATIVE | Facility: CLINIC | Age: 84
End: 2019-12-24

## 2019-12-24 NOTE — PATIENT INSTRUCTIONS
Discharge Instructions for Stroke  You have been diagnosed with a stroke, or with a TIA (transient ischemic attack). Or you have been identified as having a high risk for stroke. During a stroke, blood stops flowing to part of your brain. This can damage areas in the brain that control other parts of the body. Symptoms after a stroke depend on which part of the brain has been affected.  Stroke risk factors  Once youve had a stroke, youre at greater risk for another one. Listed below are some other factors that can increase your risk for a stroke:  · High blood pressure  · High cholesterol  · Cigarette or cigar smoking  · Diabetes  · Carotid or other artery disease  · Atrial fibrillation, atrial flutter, or other heart disease  · Not being physically active  · Obesity  · Certain blood disorders (such as sickle cell anemia)  · Excessive alcohol use  · Abuse of street drugs  · Race  · Gender  · Family history of stroke  · Diet high in salty, fried, or greasy foods  Changes in daily living  Doing your regular tasks may be difficult after youve had a stroke, but you can learn new ways to manage your daily activities. In fact, doing daily activities may help you to regain muscle strength and bring back function to affected limbs. Be patient, give yourself time to adjust, and appreciate the progress you make.  Daily activities  You may be at risk of falling. Make changes to your home to help you walk more easily. A therapist will decide if you need an assistive device to walk safely.  You may need to see an occupational therapist or physical therapist to learn new ways of doing things. For example, you may need to make adjustments when bathing or dressing:  Tips for showering or bathing  · Test the water temperature with a hand or foot that was not affected by the stroke.  · Use grab bars, a shower seat, a hand-held showerhead, and a long-handled brush.  Tips for getting dressed  · Dress while sitting, starting with  the affected side or limb.  · Wear shirts that pull easily over your head. Wear pants or skirts with elastic waistbands.  · Use zippers with loops attached to the pull tabs.  Lifestyle changes  · Take your medicines exactly as directed. Dont skip doses.  · Begin an exercise program. Ask your provider how to get started. Also ask how much activity you should try to get on a daily or weekly basis. You can benefit from simple activities such as walking or gardening.  · Limit alcohol intake. Men should have no more than 2 alcoholic drinks a day. Women should limit themselves to 1 alcoholic drink per day.  · Know your cholesterol level. Follow your providers recommendations about how to keep cholesterol under control.  · If you are a smoker, quit now. Join a stop-smoking program to improve your chances of success. Ask your provider about medicines or other methods to help you quit.  · Learn stress management techniques to help you deal with stress in your home and work life.  Diet  Your healthcare provider will give you information on dietary changes that you may need to make, based on your situation. Your provider may recommend that you see a registered dietitian for help with diet changes. Changes may include:  · Reducing fat and cholesterol intake  · Reducing salt (sodium) intake, especially if you have high blood pressure  · Eating more fresh vegetables and fruits  · Eating more lean proteins, such as fish, poultry, and beans and peas (legumes)  · Eating less red meat and processed meats  · Using low-fat dairy products  · Limiting vegetable oils and nut oils  · Limiting sweets and processed foods such as chips, cookies, and baked goods  Follow-up care  · Keep your medical appointments. Close follow-up is important to stroke rehabilitation and recovery.  · Some medicines require blood tests to check for progress or problems. Keep follow-up appointments for any blood tests ordered by your providers.  When to call  911  Call 911 right away if you have any of the following symptoms of stroke:  · Weakness, tingling, or loss of feeling on one side of your face or body  · Sudden double vision or trouble seeing in one or both eyes  · Sudden trouble talking or slurred speech  · Trouble understanding others  · Sudden, severe headache  · Dizziness, loss of balance, or a sense of falling  · Blackouts or seizures      F.A.S.T. is an easy way to remember the signs of stroke. When you see these signs, you know that you need to call 911 fast.  F.A.S.T. stands for:  · F is for face drooping. One side of the face is drooping or numb. When the person smiles, the smile is uneven.  · A is for arm weakness. One arm is weak or numb. When the person lifts both arms at the same time, one arm may drift downward.  · S is for speech difficulty. You may notice slurred speech or trouble speaking. The person can't repeat a simple sentence correctly when asked.  · T is for time to call 911. If someone shows any of these symptoms, even if they go away, call 911 immediately. Make note of the time the symptoms first appeared.  Date Last Reviewed: 8/26/2015 © 2000-2017 [employer]. [address] [place] [address]. All rights reserved. This information is not intended as a substitute for professional medical care. Always follow your healthcare professional's instructions.        Discharge Instructions for Stroke  You have been diagnosed with a stroke, or with a TIA (transient ischemic attack). Or you have been identified as having a high risk for stroke. During a stroke, blood stops flowing to part of your brain. This can damage areas in the brain that control other parts of the body. Symptoms after a stroke depend on which part of the brain has been affected.  Stroke risk factors  Once youve had a stroke, youre at greater risk for another one. Listed below are some other factors that can increase your risk for a stroke:  · High  blood pressure  · High cholesterol  · Cigarette or cigar smoking  · Diabetes  · Carotid or other artery disease  · Atrial fibrillation, atrial flutter, or other heart disease  · Not being physically active  · Obesity  · Certain blood disorders (such as sickle cell anemia)  · Excessive alcohol use  · Abuse of street drugs  · Race  · Gender  · Family history of stroke  · Diet high in salty, fried, or greasy foods  Changes in daily living  Doing your regular tasks may be difficult after youve had a stroke, but you can learn new ways to manage your daily activities. In fact, doing daily activities may help you to regain muscle strength and bring back function to affected limbs. Be patient, give yourself time to adjust, and appreciate the progress you make.  Daily activities  You may be at risk of falling. Make changes to your home to help you walk more easily. A therapist will decide if you need an assistive device to walk safely.  You may need to see an occupational therapist or physical therapist to learn new ways of doing things. For example, you may need to make adjustments when bathing or dressing:  Tips for showering or bathing  · Test the water temperature with a hand or foot that was not affected by the stroke.  · Use grab bars, a shower seat, a hand-held showerhead, and a long-handled brush.  Tips for getting dressed  · Dress while sitting, starting with the affected side or limb.  · Wear shirts that pull easily over your head. Wear pants or skirts with elastic waistbands.  · Use zippers with loops attached to the pull tabs.  Lifestyle changes  · Take your medicines exactly as directed. Dont skip doses.  · Begin an exercise program. Ask your provider how to get started. Also ask how much activity you should try to get on a daily or weekly basis. You can benefit from simple activities such as walking or gardening.  · Limit alcohol intake. Men should have no more than 2 alcoholic drinks a day. Women should  limit themselves to 1 alcoholic drink per day.  · Know your cholesterol level. Follow your providers recommendations about how to keep cholesterol under control.  · If you are a smoker, quit now. Join a stop-smoking program to improve your chances of success. Ask your provider about medicines or other methods to help you quit.  · Learn stress management techniques to help you deal with stress in your home and work life.  Diet  Your healthcare provider will give you information on dietary changes that you may need to make, based on your situation. Your provider may recommend that you see a registered dietitian for help with diet changes. Changes may include:  · Reducing fat and cholesterol intake  · Reducing salt (sodium) intake, especially if you have high blood pressure  · Eating more fresh vegetables and fruits  · Eating more lean proteins, such as fish, poultry, and beans and peas (legumes)  · Eating less red meat and processed meats  · Using low-fat dairy products  · Limiting vegetable oils and nut oils  · Limiting sweets and processed foods such as chips, cookies, and baked goods  Follow-up care  · Keep your medical appointments. Close follow-up is important to stroke rehabilitation and recovery.  · Some medicines require blood tests to check for progress or problems. Keep follow-up appointments for any blood tests ordered by your providers.  When to call 911  Call 911 right away if you have any of the following symptoms of stroke:  · Weakness, tingling, or loss of feeling on one side of your face or body  · Sudden double vision or trouble seeing in one or both eyes  · Sudden trouble talking or slurred speech  · Trouble understanding others  · Sudden, severe headache  · Dizziness, loss of balance, or a sense of falling  · Blackouts or seizures      F.A.S.T. is an easy way to remember the signs of stroke. When you see these signs, you know that you need to call 911 fast.  F.A.S.T. stands for:  · F is for face  drooping. One side of the face is drooping or numb. When the person smiles, the smile is uneven.  · A is for arm weakness. One arm is weak or numb. When the person lifts both arms at the same time, one arm may drift downward.  · S is for speech difficulty. You may notice slurred speech or trouble speaking. The person can't repeat a simple sentence correctly when asked.  · T is for time to call 911. If someone shows any of these symptoms, even if they go away, call 911 immediately. Make note of the time the symptoms first appeared.  Date Last Reviewed: 8/26/2015  © 8013-8851 [employer]. [address] [place] [address], [place] [address]. All rights reserved. This information is not intended as a substitute for professional medical care. Always follow your healthcare professional's instructions.

## 2019-12-24 NOTE — PROGRESS NOTES
Please forward this important TCC information to your provider in order to maximize the post discharge care delivery of this patient.    C3 nurse spoke with Vaishali Hoyt ( son)  for a TCC post hospital discharge follow up call. The patient does not have a scheduled HOSFU appointment with William Escalante MD  within 7-14 days post hospital discharge date 12/20/2019.C3 nurse was unable to schedule HOSFU appointment in [facility].  Please contact pcp and schedule follow up appointment using HOSFU visit type on or before 01/03/2019.    Respectfully,    Stefani Ervin LPN    [facility]    [email or web address]       Please do not reply to this message, as this inbox is not routinely monitored.

## 2020-02-28 ENCOUNTER — HOSPITAL ENCOUNTER (EMERGENCY)
Facility: HOSPITAL | Age: 85
Discharge: STILL ADMITTED OR ADMINISTRATIVE | End: 2020-02-29
Attending: EMERGENCY MEDICINE
Payer: MEDICARE

## 2020-02-28 DIAGNOSIS — R55 SYNCOPE: ICD-10-CM

## 2020-02-28 DIAGNOSIS — S06.5XAA SDH (SUBDURAL HEMATOMA): ICD-10-CM

## 2020-02-28 DIAGNOSIS — I63.81 THALAMIC STROKE: ICD-10-CM
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
## 2020-02-28 PROCEDURE — 93005 ELECTROCARDIOGRAM TRACING: CPT

## 2020-02-28 PROCEDURE — 99285 EMERGENCY DEPT VISIT HI MDM: CPT | Mod: 25

## 2020-02-28 PROCEDURE — 84484 ASSAY OF TROPONIN QUANT: CPT

## 2020-02-28 PROCEDURE — 85610 PROTHROMBIN TIME: CPT

## 2020-02-28 PROCEDURE — 80053 COMPREHEN METABOLIC PANEL: CPT

## 2020-02-28 PROCEDURE — 83880 ASSAY OF NATRIURETIC PEPTIDE: CPT

## 2020-02-28 PROCEDURE — 83605 ASSAY OF LACTIC ACID: CPT

## 2020-02-28 PROCEDURE — 82550 ASSAY OF CK (CPK): CPT

## 2020-02-28 PROCEDURE — 85025 COMPLETE CBC W/AUTO DIFF WBC: CPT

## 2020-02-29 VITALS
WEIGHT: 130 LBS | TEMPERATURE: 98 F | SYSTOLIC BLOOD PRESSURE: 109 MMHG | BODY MASS INDEX: 22.2 KG/M2 | RESPIRATION RATE: 16 BRPM | HEIGHT: 64 IN | HEART RATE: 75 BPM | OXYGEN SATURATION: 97 % | DIASTOLIC BLOOD PRESSURE: 55 MMHG

## 2020-02-29 PROBLEM — S09.90XA ACUTE HEAD TRAUMA: Status: ACTIVE | Noted: 2020-02-29

## 2020-02-29 PROBLEM — S06.5XAA SUBDURAL HEMATOMA: Status: ACTIVE | Noted: 2020-02-29

## 2020-02-29 LAB
ALBUMIN SERPL BCP-MCNC: 3.4 G/DL (ref 3.5–5.2)
ALP SERPL-CCNC: 85 U/L (ref 55–135)
ALT SERPL W/O P-5'-P-CCNC: 93 U/L (ref 10–44)
ANION GAP SERPL CALC-SCNC: 7 MMOL/L (ref 8–16)
AST SERPL-CCNC: 61 U/L (ref 10–40)
BASOPHILS # BLD AUTO: 0.04 K/UL (ref 0–0.2)
BASOPHILS NFR BLD: 0.3 % (ref 0–1.9)
BILIRUB SERPL-MCNC: 0.6 MG/DL (ref 0.1–1)
BNP SERPL-MCNC: 326 PG/ML (ref 0–99)
BUN SERPL-MCNC: 26 MG/DL (ref 10–30)
CALCIUM SERPL-MCNC: 9.2 MG/DL (ref 8.7–10.5)
CHLORIDE SERPL-SCNC: 102 MMOL/L (ref 95–110)
CK SERPL-CCNC: 81 U/L (ref 20–180)
CO2 SERPL-SCNC: 26 MMOL/L (ref 23–29)
CREAT SERPL-MCNC: 0.8 MG/DL (ref 0.5–1.4)
DIFFERENTIAL METHOD: ABNORMAL
EOSINOPHIL # BLD AUTO: 0.1 K/UL (ref 0–0.5)
EOSINOPHIL NFR BLD: 0.6 % (ref 0–8)
ERYTHROCYTE [DISTWIDTH] IN BLOOD BY AUTOMATED COUNT: 13.2 % (ref 11.5–14.5)
EST. GFR  (AFRICAN AMERICAN): >60 ML/MIN/1.73 M^2
EST. GFR  (NON AFRICAN AMERICAN): >60 ML/MIN/1.73 M^2
GLUCOSE SERPL-MCNC: 151 MG/DL (ref 70–110)
HCT VFR BLD AUTO: 40.8 % (ref 37–48.5)
HGB BLD-MCNC: 13.2 G/DL (ref 12–16)
IMM GRANULOCYTES # BLD AUTO: 0.13 K/UL (ref 0–0.04)
IMM GRANULOCYTES NFR BLD AUTO: 1 % (ref 0–0.5)
INR PPP: 1.2
LACTATE SERPL-SCNC: 1.5 MMOL/L (ref 0.5–1.9)
LYMPHOCYTES # BLD AUTO: 2.5 K/UL (ref 1–4.8)
LYMPHOCYTES NFR BLD: 18.4 % (ref 18–48)
MCH RBC QN AUTO: 30.6 PG (ref 27–31)
MCHC RBC AUTO-ENTMCNC: 32.4 G/DL (ref 32–36)
MCV RBC AUTO: 94 FL (ref 82–98)
MONOCYTES # BLD AUTO: 1.1 K/UL (ref 0.3–1)
MONOCYTES NFR BLD: 7.8 % (ref 4–15)
NEUTROPHILS # BLD AUTO: 9.8 K/UL (ref 1.8–7.7)
NEUTROPHILS NFR BLD: 71.9 % (ref 38–73)
NRBC BLD-RTO: 0 /100 WBC
PLATELET # BLD AUTO: 171 K/UL (ref 150–350)
PMV BLD AUTO: 10.4 FL (ref 9.2–12.9)
POTASSIUM SERPL-SCNC: 4.2 MMOL/L (ref 3.5–5.1)
PROT SERPL-MCNC: 6.8 G/DL (ref 6–8.4)
PROTHROMBIN TIME: 14.4 SEC (ref 10.6–14.8)
RBC # BLD AUTO: 4.32 M/UL (ref 4–5.4)
SODIUM SERPL-SCNC: 135 MMOL/L (ref 136–145)
TROPONIN I SERPL DL<=0.01 NG/ML-MCNC: <0.03 NG/ML
WBC # BLD AUTO: 13.57 K/UL (ref 3.9–12.7)

## 2020-02-29 PROCEDURE — 63600175 PHARM REV CODE 636 W HCPCS: Performed by: STUDENT IN AN ORGANIZED HEALTH CARE EDUCATION/TRAINING PROGRAM

## 2020-02-29 RX ORDER — MORPHINE SULFATE 4 MG/ML
2 INJECTION, SOLUTION INTRAMUSCULAR; INTRAVENOUS ONCE
Status: DISCONTINUED | OUTPATIENT
Start: 2020-02-29 | End: 2020-02-29

## 2020-02-29 RX ORDER — BUTALBITAL, ASPIRIN, AND CAFFEINE 325; 50; 40 MG/1; MG/1; MG/1
1 CAPSULE ORAL EVERY 4 HOURS PRN
Qty: 15 CAPSULE | Refills: 0 | Status: SHIPPED | OUTPATIENT
Start: 2020-02-29 | End: 2020-03-05

## 2020-02-29 RX ADMIN — SODIUM CHLORIDE 500 ML: 0.9 INJECTION, SOLUTION INTRAVENOUS at 03:02

## 2020-03-01 PROBLEM — D64.9 ANEMIA: Status: ACTIVE | Noted: 2020-03-01

## 2020-04-16 NOTE — ASSESSMENT & PLAN NOTE
Returned call to wife.  She reports that when Javon came in for his wellness visit early March she told him to show / tell Dr. Duke about a \"mass\" around his sternum area.  She explains that about a week before his appointment in March she asked Javon to lay flat so she could see it better.  She states she was able to visibly see the \"buldge and it was soft and mushy about the size of an egg.\"  -When Javon came home from his [facility] visit she asked Javon \"what did the Doctor say about it?\"  Javon told her \"it is just part of the bone/rib cage don't worry about it.\"      Mike explains that she found that odd, but let it go for awhile with the Covid virus.  About one week ago, she asked to see it again.  He was laying flat and she could still see the outline of it.  (Although patient has gained a few pounds since)    She is bothered by this \"mass\" and would like Javon to be seen.  They are willing to come into clinic and be seen.    The mass is not painful, discolored or tender.  She states Javon is feeling fine.  Mike would like to come with to the appointment was told she cannot due to Covid 19.     SEE HPI    Acute mild aphasia. Improving. Afib highly suspected  CT head no acute changes  Not TPA or Intra-arterial Therapy/ Catheter Based Intervention candidate    Antithrombotics for secondary stroke prevention: Antiplatelets: Aspirin: 325 mg daily    Statins for secondary stroke prevention and hyperlipidemia, if present:   Statins: Atorvastatin- 40 mg daily    Aggressive risk factor modification: HTN, HLD, Diet, Exercise     Rehab efforts: The patient has been evaluated by a stroke team provider and the therapy needs have been fully considered based off the presenting complaints and exam findings. The following therapy evaluations are needed: PT evaluate and treat, OT evaluate and treat, SLP evaluate and treat, PM&R evaluate for appropriate placement    Diagnostics ordered/pending: Carotid ultrasound to assess vasculature, HgbA1C to assess blood glucose levels, Lipid Profile to assess cholesterol levels, MRA head to assess vasculature, MRA neck/arch to assess vasculature, MRI head without contrast to assess brain parenchyma, TTE to assess cardiac function/status , Other: loop recorder    VTE prophylaxis: Heparin 5000 units SQ every 8 hours    BP parameters: Infarct: No intervention, SBP <220

## 2020-07-14 ENCOUNTER — HOSPITAL ENCOUNTER (OUTPATIENT)
Facility: HOSPITAL | Age: 85
Discharge: NURSING, REHAB OR LONG-TERM CARE | End: 2020-07-15
Attending: EMERGENCY MEDICINE | Admitting: STUDENT IN AN ORGANIZED HEALTH CARE EDUCATION/TRAINING PROGRAM
Payer: MEDICARE

## 2020-07-14 DIAGNOSIS — S82.831A CLOSED FRACTURE OF PROXIMAL END OF RIGHT FIBULA, UNSPECIFIED FRACTURE MORPHOLOGY, INITIAL ENCOUNTER: Primary | ICD-10-CM

## 2020-07-14 DIAGNOSIS — S82.209A TIBIA/FIBULA FRACTURE: ICD-10-CM
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
## 2020-07-14 DIAGNOSIS — S82.234A CLOSED NONDISPLACED OBLIQUE FRACTURE OF SHAFT OF RIGHT TIBIA, INITIAL ENCOUNTER: ICD-10-CM

## 2020-07-14 DIAGNOSIS — R52 PAIN: ICD-10-CM

## 2020-07-14 DIAGNOSIS — M79.604 RIGHT LEG PAIN: ICD-10-CM

## 2020-07-14 DIAGNOSIS — S82.409A TIBIA/FIBULA FRACTURE: ICD-10-CM

## 2020-07-14 DIAGNOSIS — S82.409A FIBULA FRACTURE: ICD-10-CM

## 2020-07-14 LAB
ABO + RH BLD: NORMAL
ALBUMIN SERPL BCP-MCNC: 3.1 G/DL (ref 3.5–5.2)
ALP SERPL-CCNC: 82 U/L (ref 55–135)
ALT SERPL W/O P-5'-P-CCNC: 16 U/L (ref 10–44)
ANION GAP SERPL CALC-SCNC: 9 MMOL/L (ref 8–16)
APTT PPP: 28.3 SEC (ref 23.6–33.3)
AST SERPL-CCNC: 24 U/L (ref 10–40)
BASOPHILS # BLD AUTO: 0.05 K/UL (ref 0–0.2)
BASOPHILS NFR BLD: 0.5 % (ref 0–1.9)
BILIRUB SERPL-MCNC: 0.5 MG/DL (ref 0.1–1)
BLD GP AB SCN CELLS X3 SERPL QL: NORMAL
BUN SERPL-MCNC: 12 MG/DL (ref 10–30)
CALCIUM SERPL-MCNC: 8.9 MG/DL (ref 8.7–10.5)
CHLORIDE SERPL-SCNC: 96 MMOL/L (ref 95–110)
CO2 SERPL-SCNC: 28 MMOL/L (ref 23–29)
CREAT SERPL-MCNC: 0.7 MG/DL (ref 0.5–1.4)
DIFFERENTIAL METHOD: ABNORMAL
EOSINOPHIL # BLD AUTO: 0.1 K/UL (ref 0–0.5)
EOSINOPHIL NFR BLD: 1.1 % (ref 0–8)
ERYTHROCYTE [DISTWIDTH] IN BLOOD BY AUTOMATED COUNT: 13.3 % (ref 11.5–14.5)
EST. GFR  (AFRICAN AMERICAN): >60 ML/MIN/1.73 M^2
EST. GFR  (NON AFRICAN AMERICAN): >60 ML/MIN/1.73 M^2
GLUCOSE SERPL-MCNC: 142 MG/DL (ref 70–110)
HCT VFR BLD AUTO: 36.8 % (ref 37–48.5)
HGB BLD-MCNC: 12 G/DL (ref 12–16)
IMM GRANULOCYTES # BLD AUTO: 0.05 K/UL (ref 0–0.04)
IMM GRANULOCYTES NFR BLD AUTO: 0.5 % (ref 0–0.5)
INR PPP: 1.1
LYMPHOCYTES # BLD AUTO: 1.3 K/UL (ref 1–4.8)
LYMPHOCYTES NFR BLD: 12.2 % (ref 18–48)
MCH RBC QN AUTO: 31.7 PG (ref 27–31)
MCHC RBC AUTO-ENTMCNC: 32.6 G/DL (ref 32–36)
MCV RBC AUTO: 97 FL (ref 82–98)
MONOCYTES # BLD AUTO: 1.2 K/UL (ref 0.3–1)
MONOCYTES NFR BLD: 10.8 % (ref 4–15)
NEUTROPHILS # BLD AUTO: 8.2 K/UL (ref 1.8–7.7)
NEUTROPHILS NFR BLD: 74.9 % (ref 38–73)
NRBC BLD-RTO: 0 /100 WBC
PLATELET # BLD AUTO: 225 K/UL (ref 150–350)
PMV BLD AUTO: 10.5 FL (ref 9.2–12.9)
POTASSIUM SERPL-SCNC: 3.9 MMOL/L (ref 3.5–5.1)
PROT SERPL-MCNC: 6.5 G/DL (ref 6–8.4)
PROTHROMBIN TIME: 13.3 SEC (ref 10.6–14.8)
RBC # BLD AUTO: 3.79 M/UL (ref 4–5.4)
SARS-COV-2 RDRP RESP QL NAA+PROBE: NEGATIVE
SODIUM SERPL-SCNC: 133 MMOL/L (ref 136–145)
TSH SERPL DL<=0.005 MIU/L-ACNC: 2.61 UIU/ML (ref 0.34–5.6)
WBC # BLD AUTO: 10.94 K/UL (ref 3.9–12.7)

## 2020-07-14 PROCEDURE — 85610 PROTHROMBIN TIME: CPT

## 2020-07-14 PROCEDURE — 29505 APPLICATION LONG LEG SPLINT: CPT

## 2020-07-14 PROCEDURE — G0378 HOSPITAL OBSERVATION PER HR: HCPCS

## 2020-07-14 PROCEDURE — 83036 HEMOGLOBIN GLYCOSYLATED A1C: CPT

## 2020-07-14 PROCEDURE — 96375 TX/PRO/DX INJ NEW DRUG ADDON: CPT | Mod: 59

## 2020-07-14 PROCEDURE — 99203 OFFICE O/P NEW LOW 30 MIN: CPT | Mod: ,,, | Performed by: ORTHOPAEDIC SURGERY

## 2020-07-14 PROCEDURE — 85025 COMPLETE CBC W/AUTO DIFF WBC: CPT

## 2020-07-14 PROCEDURE — 93005 ELECTROCARDIOGRAM TRACING: CPT | Performed by: INTERNAL MEDICINE

## 2020-07-14 PROCEDURE — 84443 ASSAY THYROID STIM HORMONE: CPT

## 2020-07-14 PROCEDURE — 27000221 HC OXYGEN, UP TO 24 HOURS

## 2020-07-14 PROCEDURE — 25000003 PHARM REV CODE 250: Performed by: STUDENT IN AN ORGANIZED HEALTH CARE EDUCATION/TRAINING PROGRAM

## 2020-07-14 PROCEDURE — S5010 5% DEXTROSE AND 0.45% SALINE: HCPCS | Performed by: EMERGENCY MEDICINE

## 2020-07-14 PROCEDURE — 80053 COMPREHEN METABOLIC PANEL: CPT

## 2020-07-14 PROCEDURE — U0002 COVID-19 LAB TEST NON-CDC: HCPCS

## 2020-07-14 PROCEDURE — 99900035 HC TECH TIME PER 15 MIN (STAT)

## 2020-07-14 PROCEDURE — 86901 BLOOD TYPING SEROLOGIC RH(D): CPT

## 2020-07-14 PROCEDURE — 25000003 PHARM REV CODE 250: Performed by: EMERGENCY MEDICINE

## 2020-07-14 PROCEDURE — 36415 COLL VENOUS BLD VENIPUNCTURE: CPT

## 2020-07-14 PROCEDURE — 85730 THROMBOPLASTIN TIME PARTIAL: CPT

## 2020-07-14 PROCEDURE — 63600175 PHARM REV CODE 636 W HCPCS: Performed by: EMERGENCY MEDICINE

## 2020-07-14 PROCEDURE — 99285 EMERGENCY DEPT VISIT HI MDM: CPT | Mod: 25

## 2020-07-14 PROCEDURE — 99203 PR OFFICE/OUTPT VISIT, NEW, LEVL III, 30-44 MIN: ICD-10-PCS | Mod: ,,, | Performed by: ORTHOPAEDIC SURGERY

## 2020-07-14 PROCEDURE — 96374 THER/PROPH/DIAG INJ IV PUSH: CPT

## 2020-07-14 RX ORDER — NITROFURANTOIN 25; 75 MG/1; MG/1
100 CAPSULE ORAL DAILY
COMMUNITY

## 2020-07-14 RX ORDER — HYDROCODONE BITARTRATE AND ACETAMINOPHEN 5; 325 MG/1; MG/1
1 TABLET ORAL EVERY 6 HOURS PRN
Status: DISCONTINUED | OUTPATIENT
Start: 2020-07-14 | End: 2020-07-16 | Stop reason: HOSPADM

## 2020-07-14 RX ORDER — LORAZEPAM 0.5 MG/1
0.5 TABLET ORAL 2 TIMES DAILY PRN
Status: DISCONTINUED | OUTPATIENT
Start: 2020-07-14 | End: 2020-07-16 | Stop reason: HOSPADM

## 2020-07-14 RX ORDER — LANOLIN ALCOHOL/MO/W.PET/CERES
800 CREAM (GRAM) TOPICAL
Status: DISCONTINUED | OUTPATIENT
Start: 2020-07-14 | End: 2020-07-16 | Stop reason: HOSPADM

## 2020-07-14 RX ORDER — NITROFURANTOIN 25; 75 MG/1; MG/1
100 CAPSULE ORAL DAILY
Status: DISCONTINUED | OUTPATIENT
Start: 2020-07-14 | End: 2020-07-16 | Stop reason: HOSPADM

## 2020-07-14 RX ORDER — DEXTROSE MONOHYDRATE AND SODIUM CHLORIDE 5; .45 G/100ML; G/100ML
1000 INJECTION, SOLUTION INTRAVENOUS
Status: COMPLETED | OUTPATIENT
Start: 2020-07-14 | End: 2020-07-14

## 2020-07-14 RX ORDER — DOCUSATE SODIUM 100 MG/1
100 CAPSULE, LIQUID FILLED ORAL 2 TIMES DAILY PRN
Status: DISCONTINUED | OUTPATIENT
Start: 2020-07-14 | End: 2020-07-16 | Stop reason: HOSPADM

## 2020-07-14 RX ORDER — POTASSIUM CHLORIDE 20 MEQ/15ML
40 SOLUTION ORAL
Status: DISCONTINUED | OUTPATIENT
Start: 2020-07-14 | End: 2020-07-16 | Stop reason: HOSPADM

## 2020-07-14 RX ORDER — CLOPIDOGREL BISULFATE 75 MG/1
75 TABLET ORAL DAILY
COMMUNITY

## 2020-07-14 RX ORDER — MORPHINE SULFATE 2 MG/ML
2 INJECTION, SOLUTION INTRAMUSCULAR; INTRAVENOUS
Status: COMPLETED | OUTPATIENT
Start: 2020-07-14 | End: 2020-07-14

## 2020-07-14 RX ORDER — ACETAMINOPHEN 325 MG/1
650 TABLET ORAL EVERY 4 HOURS PRN
Status: DISCONTINUED | OUTPATIENT
Start: 2020-07-14 | End: 2020-07-16 | Stop reason: HOSPADM

## 2020-07-14 RX ORDER — LANOLIN ALCOHOL/MO/W.PET/CERES
400 CREAM (GRAM) TOPICAL DAILY
Status: DISCONTINUED | OUTPATIENT
Start: 2020-07-14 | End: 2020-07-16 | Stop reason: HOSPADM

## 2020-07-14 RX ORDER — METOPROLOL TARTRATE 50 MG/1
50 TABLET ORAL 2 TIMES DAILY
Status: DISCONTINUED | OUTPATIENT
Start: 2020-07-14 | End: 2020-07-16 | Stop reason: HOSPADM

## 2020-07-14 RX ORDER — CETIRIZINE HYDROCHLORIDE 10 MG/1
10 TABLET ORAL DAILY
Status: DISCONTINUED | OUTPATIENT
Start: 2020-07-14 | End: 2020-07-16 | Stop reason: HOSPADM

## 2020-07-14 RX ORDER — ONDANSETRON 2 MG/ML
4 INJECTION INTRAMUSCULAR; INTRAVENOUS
Status: COMPLETED | OUTPATIENT
Start: 2020-07-14 | End: 2020-07-14

## 2020-07-14 RX ORDER — SODIUM,POTASSIUM PHOSPHATES 280-250MG
2 POWDER IN PACKET (EA) ORAL
Status: DISCONTINUED | OUTPATIENT
Start: 2020-07-14 | End: 2020-07-16 | Stop reason: HOSPADM

## 2020-07-14 RX ORDER — LANOLIN ALCOHOL/MO/W.PET/CERES
1000 CREAM (GRAM) TOPICAL DAILY
Status: DISCONTINUED | OUTPATIENT
Start: 2020-07-14 | End: 2020-07-16 | Stop reason: HOSPADM

## 2020-07-14 RX ORDER — ACETAMINOPHEN 325 MG/1
650 TABLET ORAL EVERY 8 HOURS PRN
Status: DISCONTINUED | OUTPATIENT
Start: 2020-07-14 | End: 2020-07-16 | Stop reason: HOSPADM

## 2020-07-14 RX ORDER — CHOLECALCIFEROL (VITAMIN D3) 50 MCG
1 TABLET ORAL DAILY
Status: DISCONTINUED | OUTPATIENT
Start: 2020-07-14 | End: 2020-07-16 | Stop reason: HOSPADM

## 2020-07-14 RX ORDER — TALC
6 POWDER (GRAM) TOPICAL NIGHTLY PRN
Status: DISCONTINUED | OUTPATIENT
Start: 2020-07-14 | End: 2020-07-16 | Stop reason: HOSPADM

## 2020-07-14 RX ORDER — MORPHINE SULFATE 2 MG/ML
1 INJECTION, SOLUTION INTRAMUSCULAR; INTRAVENOUS EVERY 4 HOURS PRN
Status: DISCONTINUED | OUTPATIENT
Start: 2020-07-14 | End: 2020-07-16 | Stop reason: HOSPADM

## 2020-07-14 RX ORDER — LORAZEPAM 0.5 MG/1
0.5 TABLET ORAL 2 TIMES DAILY PRN
COMMUNITY

## 2020-07-14 RX ORDER — DEXTROMETHORPHAN HYDROBROMIDE, GUAIFENESIN 5; 100 MG/5ML; MG/5ML
650 LIQUID ORAL 3 TIMES DAILY
COMMUNITY

## 2020-07-14 RX ORDER — SODIUM CHLORIDE 0.9 % (FLUSH) 0.9 %
10 SYRINGE (ML) INJECTION
Status: DISCONTINUED | OUTPATIENT
Start: 2020-07-14 | End: 2020-07-16 | Stop reason: HOSPADM

## 2020-07-14 RX ORDER — AMOXICILLIN 250 MG
1 CAPSULE ORAL 2 TIMES DAILY
Status: DISCONTINUED | OUTPATIENT
Start: 2020-07-14 | End: 2020-07-16 | Stop reason: HOSPADM

## 2020-07-14 RX ORDER — ONDANSETRON 4 MG/1
8 TABLET, ORALLY DISINTEGRATING ORAL EVERY 8 HOURS PRN
Status: DISCONTINUED | OUTPATIENT
Start: 2020-07-14 | End: 2020-07-16 | Stop reason: HOSPADM

## 2020-07-14 RX ORDER — ENOXAPARIN SODIUM 100 MG/ML
30 INJECTION SUBCUTANEOUS EVERY 24 HOURS
Status: DISCONTINUED | OUTPATIENT
Start: 2020-07-14 | End: 2020-07-14

## 2020-07-14 RX ADMIN — DEXTROSE AND SODIUM CHLORIDE 1000 ML: 5; .45 INJECTION, SOLUTION INTRAVENOUS at 07:07

## 2020-07-14 RX ADMIN — SENNOSIDES AND DOCUSATE SODIUM 1 TABLET: 8.6; 5 TABLET ORAL at 08:07

## 2020-07-14 RX ADMIN — METOPROLOL TARTRATE 50 MG: 50 TABLET, FILM COATED ORAL at 08:07

## 2020-07-14 RX ADMIN — ONDANSETRON 4 MG: 2 INJECTION INTRAMUSCULAR; INTRAVENOUS at 02:07

## 2020-07-14 RX ADMIN — HYDROCODONE BITARTRATE AND ACETAMINOPHEN 1 TABLET: 5; 325 TABLET ORAL at 09:07

## 2020-07-14 RX ADMIN — MORPHINE SULFATE 2 MG: 2 INJECTION, SOLUTION INTRAMUSCULAR; INTRAVENOUS at 02:07
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
## 2020-07-14 NOTE — NURSING
Pt son in room.  He states that the patient does NOT walk.  She would pivot to the chair with assistance.  Pt is confused and upset that she is in the hospital.    Pt son, Teo, wants to talk to Orthopedics before any surgery is done.

## 2020-07-14 NOTE — ASSESSMENT & PLAN NOTE
-Place in observation  -Xray tib/fib: Recent obliquely oriented fractures of the proximal shaft of the   fibula and mid to distal shaft of the tibia with slight displacement.   - Ortho, Dr. Sanchez, consulted in ED. Possible intervention in AM as patient in ambulatory  - Per NH staff, patient able to make 2-3 steps with walker   - pain control  - pt/ot consulted
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
## 2020-07-14 NOTE — SUBJECTIVE & OBJECTIVE
Past Medical History:   Diagnosis Date    Hypertension     Paroxysmal SVT (supraventricular tachycardia)        No past surgical history on file.    Review of patient's allergies indicates:   Allergen Reactions    Contrast media     Iodine and iodide containing products        No current facility-administered medications on file prior to encounter.      Current Outpatient Medications on File Prior to Encounter   Medication Sig    acetaminophen (TYLENOL) 650 MG TbSR Take 650 mg by mouth 3 (three) times daily.    cetirizine (ZYRTEC) 10 MG tablet Take 10 mg by mouth once daily.     cholecalciferol, vitamin D3, (VITAMIN D3) 2,000 unit Tab Take 1 tablet by mouth once daily.     clopidogreL (PLAVIX) 75 mg tablet Take 75 mg by mouth once daily.    cyanocobalamin (VITAMIN B-12) 1000 MCG tablet Take 1,000 mcg by mouth once daily.     docusate sodium (COLACE) 100 MG capsule Take 1 capsule (100 mg total) by mouth 2 (two) times daily as needed for Constipation.    LORazepam (ATIVAN) 0.5 MG tablet Take 0.5 mg by mouth 2 (two) times daily as needed for Anxiety.    magnesium oxide (MAG-OX) 400 mg (241.3 mg magnesium) tablet Take 400 mg by mouth once daily.     metoprolol tartrate (LOPRESSOR) 50 MG tablet Take 50 mg by mouth 2 (two) times daily.    nitrofurantoin, macrocrystal-monohydrate, (MACROBID) 100 MG capsule Take 100 mg by mouth once daily.    levETIRAcetam (KEPPRA) 250 MG Tab Take 1 tablet (250 mg total) by mouth 2 (two) times daily. for 7 days    [DISCONTINUED] anastrozole (ARIMIDEX) 1 mg Tab Take 1 mg by mouth once daily .    [DISCONTINUED] digoxin (LANOXIN) 125 mcg tablet Take 125 mcg by mouth once daily.      Family History     Problem Relation (Age of Onset)    Alcohol abuse Father    No Known Problems Mother        Tobacco Use    Smoking status: Former Smoker     Packs/day: 0.50     Years: 25.00     Pack years: 12.50     Types: Cigarettes     Quit date: 1966     Years since quittin.5     Smokeless tobacco: Never Used   Substance and Sexual Activity    Alcohol use: Never     Frequency: Never    Drug use: Never    Sexual activity: Not Currently     Review of Systems   Constitutional: Negative for chills and fever.   HENT: Negative for congestion and rhinorrhea.    Respiratory: Negative for chest tightness, shortness of breath and wheezing.    Cardiovascular: Negative for chest pain and leg swelling.   Gastrointestinal: Negative for abdominal pain, constipation, diarrhea, nausea and vomiting.   Genitourinary: Negative for dysuria and hematuria.   Musculoskeletal: Positive for arthralgias and myalgias.   Skin: Negative for rash.   Neurological: Negative for dizziness, weakness and headaches.   Psychiatric/Behavioral: Negative for behavioral problems. The patient is not nervous/anxious.      Objective:     Vital Signs (Most Recent):  Temp: 98.3 °F (36.8 °C) (07/14/20 1302)  Pulse: 60 (07/14/20 1456)  Resp: 19 (07/14/20 1456)  BP: 133/60 (07/14/20 1430)  SpO2: 100 % (07/14/20 1456) Vital Signs (24h Range):  Temp:  [97.7 °F (36.5 °C)-98.3 °F (36.8 °C)] 98.3 °F (36.8 °C)  Pulse:  [53-63] 60  Resp:  [13-22] 19  SpO2:  [97 %-100 %] 100 %  BP: (114-153)/(53-68) 133/60     Weight: 54.4 kg (120 lb)  Body mass index is 22.67 kg/m².    Physical Exam  Vitals signs and nursing note reviewed.   Constitutional:       Appearance: She is well-developed.   HENT:      Head: Atraumatic.      Nose: Nose normal.      Mouth/Throat:      Mouth: Mucous membranes are moist.   Eyes:      Conjunctiva/sclera: Conjunctivae normal.      Pupils: Pupils are equal, round, and reactive to light.   Neck:      Musculoskeletal: Normal range of motion and neck supple.      Thyroid: No thyromegaly.      Vascular: No JVD.   Cardiovascular:      Rate and Rhythm: Normal rate and regular rhythm.      Heart sounds: Normal heart sounds. No murmur. No friction rub. No gallop.    Pulmonary:      Effort: Pulmonary effort is normal.      Breath  sounds: Normal breath sounds. No wheezing or rales.      Comments: 2LNC (baseline)  Abdominal:      General: Bowel sounds are normal. There is no distension.      Palpations: Abdomen is soft.      Tenderness: There is no abdominal tenderness.   Musculoskeletal:         General: Swelling, tenderness and signs of injury present.      Comments: Right lower extremity:  Bruising, erythema and inflammation noted  Decreased rom ankle 2/2 pain  Sensation to foot and leg intact   Skin:     General: Skin is warm and dry.      Findings: Bruising present.   Neurological:      Mental Status: She is alert and oriented to person, place, and time.      Deep Tendon Reflexes: Reflexes are normal and symmetric.   Psychiatric:         Behavior: Behavior normal.            Significant Labs:   CBC:   Recent Labs   Lab 07/14/20  1145   WBC 10.94   HGB 12.0   HCT 36.8*        CMP:   Recent Labs   Lab 07/14/20  1145   *   K 3.9   CL 96   CO2 28   *   BUN 12   CREATININE 0.7   CALCIUM 8.9   PROT 6.5   ALBUMIN 3.1*   BILITOT 0.5   ALKPHOS 82   AST 24   ALT 16   ANIONGAP 9   EGFRNONAA >60.0     Coagulation:   Recent Labs   Lab 07/14/20  1145   INR 1.1   APTT 28.3     Significant Imaging: I have reviewed all pertinent imaging results/findings within the past 24 hours.    X-Ray Tibia Fibula 2 View Right   IMPRESSION:   Recent obliquely oriented fractures of the proximal shaft of the   fibula and mid to distal shaft of the tibia with slight displacement.     X-Ray Chest AP   IMPRESSION:   1. No acute radiographic abnormalities.     Results for orders placed or performed during the hospital encounter of 07/14/20   EKG 12-lead    Narrative    Test Reason : S82.209A,S82.409A,    Vent. Rate : 062 BPM     Atrial Rate : 062 BPM     P-R Int : 194 ms          QRS Dur : 140 ms      QT Int : 468 ms       P-R-T Axes : -01 -46 110 degrees     QTc Int : 475 ms    Normal sinus rhythm  Left axis deviation  Left bundle branch block  Abnormal  ECG  When compared with ECG of 28-FEB-2020 23:17,  No significant change was found    Referred By: [email or web address]   SELF           Confirmed By:
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
## 2020-07-15 VITALS
SYSTOLIC BLOOD PRESSURE: 151 MMHG | DIASTOLIC BLOOD PRESSURE: 80 MMHG | RESPIRATION RATE: 16 BRPM | HEART RATE: 80 BPM | WEIGHT: 108.44 LBS | BODY MASS INDEX: 20.47 KG/M2 | HEIGHT: 61 IN | OXYGEN SATURATION: 94 % | TEMPERATURE: 98 F

## 2020-07-15 LAB
ANION GAP SERPL CALC-SCNC: 10 MMOL/L (ref 8–16)
BASOPHILS # BLD AUTO: 0.04 K/UL (ref 0–0.2)
BASOPHILS NFR BLD: 0.4 % (ref 0–1.9)
BUN SERPL-MCNC: 12 MG/DL (ref 10–30)
CALCIUM SERPL-MCNC: 9 MG/DL (ref 8.7–10.5)
CHLORIDE SERPL-SCNC: 96 MMOL/L (ref 95–110)
CO2 SERPL-SCNC: 28 MMOL/L (ref 23–29)
CREAT SERPL-MCNC: 0.6 MG/DL (ref 0.5–1.4)
DIFFERENTIAL METHOD: ABNORMAL
EOSINOPHIL # BLD AUTO: 0.3 K/UL (ref 0–0.5)
EOSINOPHIL NFR BLD: 2.5 % (ref 0–8)
ERYTHROCYTE [DISTWIDTH] IN BLOOD BY AUTOMATED COUNT: 13.3 % (ref 11.5–14.5)
EST. GFR  (AFRICAN AMERICAN): >60 ML/MIN/1.73 M^2
EST. GFR  (NON AFRICAN AMERICAN): >60 ML/MIN/1.73 M^2
ESTIMATED AVG GLUCOSE: 111 MG/DL (ref 68–131)
GLUCOSE SERPL-MCNC: 106 MG/DL (ref 70–110)
HBA1C MFR BLD HPLC: 5.5 % (ref 4.5–6.2)
HCT VFR BLD AUTO: 35.7 % (ref 37–48.5)
HGB BLD-MCNC: 11.3 G/DL (ref 12–16)
IMM GRANULOCYTES # BLD AUTO: 0.02 K/UL (ref 0–0.04)
IMM GRANULOCYTES NFR BLD AUTO: 0.2 % (ref 0–0.5)
LYMPHOCYTES # BLD AUTO: 3.3 K/UL (ref 1–4.8)
LYMPHOCYTES NFR BLD: 32.5 % (ref 18–48)
MAGNESIUM SERPL-MCNC: 1.8 MG/DL (ref 1.6–2.6)
MCH RBC QN AUTO: 30.7 PG (ref 27–31)
MCHC RBC AUTO-ENTMCNC: 31.7 G/DL (ref 32–36)
MCV RBC AUTO: 97 FL (ref 82–98)
MONOCYTES # BLD AUTO: 1.1 K/UL (ref 0.3–1)
MONOCYTES NFR BLD: 11.4 % (ref 4–15)
NEUTROPHILS # BLD AUTO: 5.3 K/UL (ref 1.8–7.7)
NEUTROPHILS NFR BLD: 53 % (ref 38–73)
NRBC BLD-RTO: 0 /100 WBC
PHOSPHATE SERPL-MCNC: 3.5 MG/DL (ref 2.7–4.5)
PLATELET # BLD AUTO: 204 K/UL (ref 150–350)
PMV BLD AUTO: 10 FL (ref 9.2–12.9)
POTASSIUM SERPL-SCNC: 4.4 MMOL/L (ref 3.5–5.1)
RBC # BLD AUTO: 3.68 M/UL (ref 4–5.4)
SODIUM SERPL-SCNC: 134 MMOL/L (ref 136–145)
WBC # BLD AUTO: 9.99 K/UL (ref 3.9–12.7)

## 2020-07-15 PROCEDURE — 84100 ASSAY OF PHOSPHORUS: CPT

## 2020-07-15 PROCEDURE — 80048 BASIC METABOLIC PNL TOTAL CA: CPT

## 2020-07-15 PROCEDURE — 36415 COLL VENOUS BLD VENIPUNCTURE: CPT

## 2020-07-15 PROCEDURE — 27000221 HC OXYGEN, UP TO 24 HOURS

## 2020-07-15 PROCEDURE — 97530 THERAPEUTIC ACTIVITIES: CPT

## 2020-07-15 PROCEDURE — 94761 N-INVAS EAR/PLS OXIMETRY MLT: CPT

## 2020-07-15 PROCEDURE — G0378 HOSPITAL OBSERVATION PER HR: HCPCS

## 2020-07-15 PROCEDURE — 83735 ASSAY OF MAGNESIUM: CPT

## 2020-07-15 PROCEDURE — 97165 OT EVAL LOW COMPLEX 30 MIN: CPT

## 2020-07-15 PROCEDURE — 25000003 PHARM REV CODE 250: Performed by: STUDENT IN AN ORGANIZED HEALTH CARE EDUCATION/TRAINING PROGRAM

## 2020-07-15 PROCEDURE — 85025 COMPLETE CBC W/AUTO DIFF WBC: CPT

## 2020-07-15 PROCEDURE — 97163 PT EVAL HIGH COMPLEX 45 MIN: CPT

## 2020-07-15 PROCEDURE — 99900035 HC TECH TIME PER 15 MIN (STAT)

## 2020-07-15 RX ORDER — HYDROCODONE BITARTRATE AND ACETAMINOPHEN 5; 325 MG/1; MG/1
1 TABLET ORAL EVERY 6 HOURS PRN
Qty: 1 TABLET | Refills: 0
Start: 2020-07-15

## 2020-07-15 RX ADMIN — MAGNESIUM OXIDE 400 MG: 400 TABLET ORAL at 08:07

## 2020-07-15 RX ADMIN — NITROFURANTOIN MONOHYDRATE/MACROCRYSTALLINE 100 MG: 25; 75 CAPSULE ORAL at 08:07

## 2020-07-15 RX ADMIN — CETIRIZINE HYDROCHLORIDE 10 MG: 10 TABLET, FILM COATED ORAL at 08:07

## 2020-07-15 RX ADMIN — METOPROLOL TARTRATE 50 MG: 50 TABLET, FILM COATED ORAL at 08:07

## 2020-07-15 RX ADMIN — SENNOSIDES AND DOCUSATE SODIUM 1 TABLET: 8.6; 5 TABLET ORAL at 08:07

## 2020-07-15 RX ADMIN — CYANOCOBALAMIN TAB 1000 MCG 1000 MCG: 1000 TAB at 08:07

## 2020-07-15 RX ADMIN — Medication 2000 UNITS: at 09:07

## 2020-07-15 NOTE — HOSPITAL COURSE
Patient seen by Ortho  Spoke with patient's son Teo who agrees that she is not a good surgical candidate.  As per ortho plan is to keep her splinted for one week and then switch to cast or boot. Pt is to be non-weightbearing.  Patient is currently on hospice at [facility]. Hospice to manage pain
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
## 2020-07-15 NOTE — PLAN OF CARE
07/15/20 1439   Discharge Assessment   Assessment Type Discharge Planning Assessment   Confirmed/corrected address and phone number on facesheet?   ([facility] [address], [place])   [facility]   Lives With facility resident   Able to Return to Prior Arrangements yes   Discharge Plan A Hospice/home   Discharge Plan B Other  ([facility] with higher level of care)   DME Needed Upon Discharge  none   Patient/Family in Agreement with Plan yes

## 2020-07-15 NOTE — CONSULTS
Patient working with pt at present.  Buttock assessed, no breaks in skin, no redness.  calmoseptine in use( instructed to use thin layer) due to incontience and mepilex, air mattress on bed.  Patient has a long leg splint on right leg, toes are overlapping, gauze weaved thru toes .  Purple bruising to left leg. No needs at this time.

## 2020-07-15 NOTE — PLAN OF CARE
07/15/20 0749   Patient Assessment/Suction   Level of Consciousness (AVPU) alert   Respiratory Effort Normal;Unlabored   Expansion/Accessory Muscles/Retractions no use of accessory muscles;no retractions   PRE-TX-O2   O2 Device (Oxygen Therapy) nasal cannula   $ Is the patient on Low Flow Oxygen? Yes   Flow (L/min) 2   SpO2 100 %   Pulse Oximetry Type Intermittent   $ Pulse Oximetry - Multiple Charge Pulse Oximetry - Multiple   Pulse 67   Resp 18   Respiratory Evaluation   $ Care Plan Tech Time 15 min   Evaluation For   (care plan)
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
## 2020-07-15 NOTE — RESPIRATORY THERAPY
07/14/20 2117   Patient Assessment/Suction   Level of Consciousness (AVPU) alert   Respiratory Effort Normal;Unlabored   Expansion/Accessory Muscles/Retractions expansion symmetric;no retractions;no use of accessory muscles   All Lung Fields Breath Sounds diminished   Rhythm/Pattern, Respiratory no shortness of breath reported;pattern regular;unlabored   PRE-TX-O2   O2 Device (Oxygen Therapy) nasal cannula   $ Is the patient on Low Flow Oxygen? Yes   Flow (L/min) 2   SpO2 95 %   Pulse 86   Resp 16   Respiratory Interventions   Cough And Deep Breathing done with encouragement   Breathing Techniques/Airway Clearance deep/controlled cough encouraged;diaphragmatic breathing promoted   Respiratory Evaluation   $ Care Plan Tech Time 15 min   Evaluation For New Orders   Home Oxygen   Has Home Oxygen? No   Home Aerosol, MDI, DPI, and Other Treatments/Therapies   Home Respiratory Therapy Per Patient/Review of Chart No   Oxygen Care Plan   Oxygen Care Plan Per Protocol   SPO2 Goal (%) MD order   Rationale SpO2 is <92% (Non-cardiac Pt.)

## 2020-07-15 NOTE — PLAN OF CARE
07/15/20 1649   Final Note   Assessment Type Final Discharge Note   Anticipated Discharge Disposition HospiceHome   Post-Acute Status   Post-Acute Authorization   (Currently on [facility])   Discharge Delays None known at this time     Discharge via [facility] stretcher with PHN auth#[id number]. Call placed to [facility]sales informing of transport. No questions verbalized.

## 2020-07-15 NOTE — PT/OT/SLP EVAL
Occupational Therapy   Evaluation and Discharge Note    Name: Vaishali Hoyt  MRN: [id number]  Admitting Diagnosis:  Fibula fracture      Recommendations:     Discharge Recommendations: nursing facility, basic  Discharge Equipment Recommendations:  none  Barriers to discharge:  None    Assessment:     Vaishali Hoyt is a 89 y.o. female with a medical diagnosis of Fibula fracture. At this time, patient is functioning at their prior level of function and does not require further acute OT services.     Plan:     During this hospitalization, patient does not require further acute OT services.  Please re-consult if situation changes.    · Plan of Care Reviewed with: patient    Subjective     Chief Complaint: None  Patient/Family Comments/goals: None    Occupational Profile:  Living Environment: Nursing Home (detention Care)   Previous level of function: total care for ADLs, but can assist with feeding  Roles and Routines: Limited secondary to decreased mobility.  Equipment Used at home:  wheelchair, shower chair  Assistance upon Discharge: NH Staff    Pain/Comfort:  · Pain Rating 1: 0/10  · Pain Rating Post-Intervention 1: 0/10    Patients cultural, spiritual, Oriental orthodox conflicts given the current situation: no    Objective:     Communicated with: nurse prior to session.  Patient found HOB elevated with telemetry, peripheral IV upon OT entry to room.    General Precautions: Standard, fall   Orthopedic Precautions:RLE non weight bearing   Braces: (RLE splint)     Occupational Performance:    Bed Mobility:    · Patient completed Scooting/Bridging with total assistance  · Patient completed Supine to Sit with total assistance  · Patient completed Sit to Supine with total assistance   · Performed unsupported sitting EOB with minimal assistance    Activities of Daily Living:  · Feeding:  minimum assistance to drink orange juice pack with hand over hand.  · Lower Body Dressing: dependence     Cognitive/Visual  Perceptual:  Cognitive/Psychosocial Skills:     -       Oriented to: Person   -       Follows Commands/attention:Follows one-step commands  -       Communication: uses few word sentences, intermittently  -       Memory: Impaired STM  -       Safety awareness/insight to disability: impaired   -       Mood/Affect/Coping skills/emotional control: Flat affect  Visual/Perceptual:      -impaired  age related macular degeneration    Physical Exam:  Balance:    -       Sitting: Minimal Assist  Upper Extremity Range of Motion:     -       Right Upper Extremity: shouler flexion up to 60 degrees  -       Left Upper Extremity: shoulder flexion up to 90 degrees  Upper Extremity Strength:    -       Right Upper Extremity: shoulder 2/5, elbow/wrist 3/5  -       Left Upper Extremity: shoulder 2/5, elbow/wrist 3/5   Strength:    -       Right Upper Extremity: fair  -       Left Upper Extremity: fair  Fine Motor Coordination:    -       Intact    AMPAC 6 Click ADL:  AMPAC Total Score: 8    Treatment & Education:  Patient at functional baseline for ADLs. Dependent for ADLs at Nursing home. Wheelchair bound. No skilled OT services to provide.   Education:    Patient left HOB elevated with all lines intact, call button in reach and bed alarm on    GOALS:   Multidisciplinary Problems     Occupational Therapy Goals     Not on file                History:     Past Medical History:   Diagnosis Date    Hypertension     Paroxysmal SVT (supraventricular tachycardia)        History reviewed. No pertinent surgical history.    Time Tracking:     OT Date of Treatment:    OT Start Time: 0936  OT Stop Time: 0951  OT Total Time (min): 15 min    Billable Minutes:Evaluation 15    John Santana OT  7/15/2020

## 2020-07-15 NOTE — PT/OT/SLP EVAL
Physical Therapy Evaluation and Discharge Note    Patient Name:  Vaishali Hoyt   MRN:  [id number]    Recommendations:     Discharge Recommendations:  nursing facility, basic   Discharge Equipment Recommendations:     Barriers to discharge: None    Assessment:     Vaishali Hoyt is a 89 y.o. female admitted with a medical diagnosis of Fibula fracture. .  At this time, patient is functioning at their prior level of function and does not require further acute PT services. Pt was bedbound/chair bound before R fibular fracture. She was a resident of [facility] and was receiving Hospice Care.   Discharge plans are for pt to return to facility. Pt t/f'ed to EOB with Total A x2 . She has hard  posterior splint in place on R leg.    Recent Surgery: * No surgery found *      Plan:     During this hospitalization, patient does not require further acute PT services.  Please re-consult if situation changes.      Subjective     Chief Complaint:   Patient/Family Comments/goals  Pain/Comfort:  ·      Patients cultural, spiritual, Episcopal conflicts given the current situation:      Living Environment:    Prior to admission, patients level of function was probably Max/Total A.  Equipment used at home:  .   Upon discharge, patient will have assistance from facility    Objective:     Communicated with nurse prior to session.  Patient found supine with peripheral IV, telemetry upon PT entry to room.    General Precautions: Standard, fall   Orthopedic Precautions:RLE non weight bearing   Braces:       Exams:  · Cognitive Exam:  Patient is oriented to Person  · RUE ROM: less than 60 degrees of shoulder elevation  · LUE ROM: less than 90 degrees of shoulder elevation  · RLE ROM: immobilized  · RLE Strength: below functional limits  · LLE ROM: WFL  · LLE Strength: 3/5 strength    Functional Mobility:  · Bed Mobility:     · Rolling Left:  total assistance  · Rolling Right: total assistance  · Supine to Sit: total assistance and of 2  persons  · Sit to Supine: total assistance and of 2 persons  · Balance:good/fair sitting balance    AM-PAC 6 CLICK MOBILITY  Total Score:6       Therapeutic Activities and Exercises:  T/F supine <>sit with total A x2    AM-PAC 6 CLICK MOBILITY  Total Score:6     Patient left supine with all lines intact, call button in reach and bed alarm on.    GOALS:   Multidisciplinary Problems     Physical Therapy Goals     Not on file                History:     Past Medical History:   Diagnosis Date    Hypertension     Paroxysmal SVT (supraventricular tachycardia)        History reviewed. No pertinent surgical history.    Time Tracking:     PT Received On: 07/15/20  PT Start Time: 0925     PT Stop Time: 0950  PT Total Time (min): 25 min     Billable Minutes: Evaluation 5 minutes and Therapeutic Activity 10 minutes      Romana Harris, PT  07/15/2020

## 2020-07-15 NOTE — NURSING
Short splint removed & long leg splint applied to pt's right leg by house supervisor, Alex. Lobito given 30 minutes prior to application. Circulation to pt's foot appears better than before. Updated in flowsheet. Will continue to monitor.

## 2020-07-15 NOTE — PLAN OF CARE
07/15/20 1556   DUMONT Message   Medicare Outpatient and Observation Notification regarding financial responsibility Given to patient/caregiver;Explained to patient/caregiver;Signed/date by patient/caregiver;Other (comments)  (spoke to Teo Hoyt (son))   Date DUMONT was signed 07/15/20   Time DUMONT was signed [id number]
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
## 2020-09-18 ENCOUNTER — TELEPHONE (OUTPATIENT)
Dept: ORTHOPEDICS | Facility: CLINIC | Age: 85
End: 2020-09-18

## 2020-09-18 NOTE — TELEPHONE ENCOUNTER
Spoke to Kerwin with [facility]. Calling to see when patient can have the splint removed. States she is bed bound and they are able to do xrays at the facility. Dr Ricks was consulted on 7/14/20 regarding patient in the ED. Right fibula fracture. Please advise.

## 2020-09-18 NOTE — TELEPHONE ENCOUNTER
----- Message from Ally Holcomb MA sent at 9/18/2020  3:44 PM CDT -----  Contact: [facility]  Kerwin  Needs cast removal appt   Call back

## 2020-09-24 ENCOUNTER — TELEPHONE (OUTPATIENT)
Dept: ORTHOPEDICS | Facility: CLINIC | Age: 85
End: 2020-09-24

## 2020-09-24 NOTE — TELEPHONE ENCOUNTER
----- Message from Ally Holcomb MA sent at 9/24/2020 10:49 AM CDT -----  Contact: Kerwin shepherd  Questions on xray   Call back

## 2020-09-24 NOTE — TELEPHONE ENCOUNTER
Returned call to Kerwin with Hospice. No answer, unable to leave voicemail. Per Dr Ricks, pt should have been seen in clinic already. Needs to come in or have physician at the facility assume her care for the fracture and ongoing treatment.